# Patient Record
Sex: FEMALE | Race: WHITE | NOT HISPANIC OR LATINO | URBAN - METROPOLITAN AREA
[De-identification: names, ages, dates, MRNs, and addresses within clinical notes are randomized per-mention and may not be internally consistent; named-entity substitution may affect disease eponyms.]

---

## 2017-06-20 ENCOUNTER — OUTPATIENT (OUTPATIENT)
Dept: OUTPATIENT SERVICES | Facility: HOSPITAL | Age: 72
LOS: 1 days | Discharge: HOME | End: 2017-06-20

## 2017-09-12 DIAGNOSIS — I97.2 POSTMASTECTOMY LYMPHEDEMA SYNDROME: ICD-10-CM

## 2017-11-30 PROBLEM — Z00.00 ENCOUNTER FOR PREVENTIVE HEALTH EXAMINATION: Status: ACTIVE | Noted: 2017-11-30

## 2017-12-02 ENCOUNTER — RECORD ABSTRACTING (OUTPATIENT)
Age: 72
End: 2017-12-02

## 2017-12-07 ENCOUNTER — APPOINTMENT (OUTPATIENT)
Dept: SURGERY | Facility: CLINIC | Age: 72
End: 2017-12-07
Payer: MEDICARE

## 2017-12-07 VITALS
DIASTOLIC BLOOD PRESSURE: 72 MMHG | HEIGHT: 61 IN | SYSTOLIC BLOOD PRESSURE: 120 MMHG | BODY MASS INDEX: 31.34 KG/M2 | WEIGHT: 166 LBS

## 2017-12-07 PROCEDURE — 99212 OFFICE O/P EST SF 10 MIN: CPT

## 2018-06-07 ENCOUNTER — APPOINTMENT (OUTPATIENT)
Dept: SURGERY | Facility: CLINIC | Age: 73
End: 2018-06-07
Payer: MEDICARE

## 2018-06-07 ENCOUNTER — OTHER (OUTPATIENT)
Age: 73
End: 2018-06-07

## 2018-06-07 VITALS
HEIGHT: 61 IN | SYSTOLIC BLOOD PRESSURE: 136 MMHG | WEIGHT: 160 LBS | DIASTOLIC BLOOD PRESSURE: 82 MMHG | BODY MASS INDEX: 30.21 KG/M2

## 2018-06-07 PROCEDURE — 99213 OFFICE O/P EST LOW 20 MIN: CPT

## 2018-09-17 ENCOUNTER — APPOINTMENT (OUTPATIENT)
Dept: PLASTIC SURGERY | Facility: CLINIC | Age: 73
End: 2018-09-17
Payer: MEDICARE

## 2018-09-17 VITALS — HEIGHT: 61 IN | BODY MASS INDEX: 30.4 KG/M2 | WEIGHT: 161 LBS

## 2018-09-17 DIAGNOSIS — Z92.3 PERSONAL HISTORY OF IRRADIATION: ICD-10-CM

## 2018-09-17 DIAGNOSIS — Z86.018 PERSONAL HISTORY OF OTHER BENIGN NEOPLASM: ICD-10-CM

## 2018-09-17 DIAGNOSIS — Z78.9 OTHER SPECIFIED HEALTH STATUS: ICD-10-CM

## 2018-09-17 DIAGNOSIS — Z92.21 PERSONAL HISTORY OF ANTINEOPLASTIC CHEMOTHERAPY: ICD-10-CM

## 2018-09-17 DIAGNOSIS — Z85.3 PERSONAL HISTORY OF MALIGNANT NEOPLASM OF BREAST: ICD-10-CM

## 2018-09-17 PROCEDURE — 99203 OFFICE O/P NEW LOW 30 MIN: CPT

## 2018-09-17 RX ORDER — UBIDECARENONE 100 MG
100 CAPSULE ORAL
Refills: 0 | Status: DISCONTINUED | COMMUNITY
End: 2018-09-17

## 2019-06-03 ENCOUNTER — APPOINTMENT (OUTPATIENT)
Dept: SURGERY | Facility: CLINIC | Age: 74
End: 2019-06-03
Payer: MEDICARE

## 2019-06-03 VITALS
HEIGHT: 61 IN | WEIGHT: 164 LBS | BODY MASS INDEX: 30.96 KG/M2 | SYSTOLIC BLOOD PRESSURE: 128 MMHG | DIASTOLIC BLOOD PRESSURE: 74 MMHG

## 2019-06-03 PROCEDURE — 99212 OFFICE O/P EST SF 10 MIN: CPT

## 2019-06-04 NOTE — PHYSICAL EXAM
[de-identified] : no adenopathy [de-identified] : no new masses or suspicious areas are palpable bilaterally.  The implants are asymmetrical but appeared to be intact. No axillary adenopathy or upper extremity bilaterally.

## 2019-06-04 NOTE — HISTORY OF PRESENT ILLNESS
[de-identified] : The patient notes no new symptoms or changes in either breast region. She is still unhappy with the cosmetic outcome of her breast reconstruction and since her last office visit in July of last year, she has consulted plastic surgery and plans to return to Dr. Nathan when ready to proceed with revision surgery.\par \par July 2015: Left MRM (T2N1), right simple mastectomy and SLNB(T1cN0).

## 2019-06-07 ENCOUNTER — APPOINTMENT (OUTPATIENT)
Dept: PLASTIC SURGERY | Facility: CLINIC | Age: 74
End: 2019-06-07
Payer: MEDICARE

## 2019-06-07 VITALS — HEIGHT: 62 IN | BODY MASS INDEX: 30.18 KG/M2 | WEIGHT: 164 LBS

## 2019-06-07 PROCEDURE — 99214 OFFICE O/P EST MOD 30 MIN: CPT

## 2019-06-07 RX ORDER — LOSARTAN POTASSIUM 25 MG/1
25 TABLET, FILM COATED ORAL
Refills: 0 | Status: DISCONTINUED | COMMUNITY
End: 2019-06-07

## 2019-06-07 RX ORDER — CIPROFLOXACIN HYDROCHLORIDE 500 MG/1
500 TABLET, FILM COATED ORAL
Qty: 20 | Refills: 0 | Status: DISCONTINUED | COMMUNITY
Start: 2019-05-23 | End: 2019-06-07

## 2019-06-07 NOTE — ASSESSMENT
[FreeTextEntry1] : 74y/o F with hx of BL Breast CA s/p Chemotherapy and left breast XRT, s/p TE and silicone implant reconstruction (submuscular) with bilateral symptomatic capsular contracture.\par \par -Patient agreed to proceed with Revision of bilateral reconstructed breast with implant exchange, capsulectomy and possible acellular dermal matrix insertion. \par \par -Will address right medial dogear at time of implant exchange.\par -Reviewed implant options for saline vs. silicone implants.  Reviewed B/R/A of each.  Patient agreed for smooth, round silicone implants.  I reviewed her old operative report.  Patient has 350 smooth round high profile.\par -Regarding implant volume, patient desires slightly larger implant if possible.  Will select implant between 350-415 ml.  Patient understands final volume will rely on intraoperative assessment and upsizing may be prohibitive by XRT soft tissue changes.\par -Reviewed and confirmed my recommendation for staged autologous fat grafting at a later staged after implant exchange.  I also recommended addressing the right axillary breast tissue at a later stage with direct excision.  \par -Patient endorsed that she would like to proceed with implant exchange before considering other treatment options if needed in the future (pedicled latissimus dorsi flap reconstruction +/- silicone implant for symmetry).\par -Regarding implant exchange, the benefits, risks, and outcomes of implant surgery discussed.  The risks included but are not limited to bleeding, infection, seroma, hematoma, paraesthesia, poor wound healing, implant failure (infection, extrusion, rupture), implant malposition, capsular contracture, asymmetry, small risk of breast-implant associated lymphoma, possible need for additional planned or unplanned surgery including revision and/or autologous tissue reconstruction (e.g. pedicled latissismus dorsi flap, TDAP flap, etc), and dissatisfaction with outcome.  We also discussed the likelihood of additional surgery on the breast over the course of her lifetime.\par -Hold aspirin 81 mg 1 week before surgery, in not otherwise contraindicated.\par -I counseled her to follow up with the Endocrinologist for hypercalcemia clearance and medical clearance before proceeding with breast reconstruction\par -All questions were answered to the patient's apparent satisfaction.  Informed consent was obtained.\par -Will schedule outpatient procedure under CA at the patient earliest convenience

## 2019-06-07 NOTE — HISTORY OF PRESENT ILLNESS
[FreeTextEntry1] : 74 yo with history of bilateral breast cancer (dx 2015-left stage T2N1 invasive carcinoma, right- T1CN) invasive carcinoma, ER/[place] positive) s/p left MRM and right simple mastectomy and SLN followed by subpectoral TE placement, ?mesh, and silicone implant exchange with Dr. Brown at [facility]. Pt also underwent CTX completed Dec/16 and XRT completed in 2016. Denies wound healing issues. Pt now c/o dissatisfaction with cosmetic result as well as left breast discomfort. Sought second opinion with Dr. Brown in [place].  \par \par Pt was also recently diagnosed with hypercalcemia on lab work, pending endocrinology consultation for further workup.  Nonsmoker\par \par Interval hx (6/7/19): Here for follow up to confirm her breast reconstruction revision plan.  Pt just returned from [place] were she was for the past 6 months.  Her  is present for the encourter.  She reports that her hypercalcemia is now stable and has be taken off of calcium therapy (Dr. Lama).  She was evaluated by Dr. Renae who reports no evidence of breast disease.  Patient is here to confirm her breast revision surgical plan.  Pt is still interested in less invasive breast reconstructive option.

## 2019-06-07 NOTE — PHYSICAL EXAM
[de-identified] : well-appearing, NAD [de-identified] : BL breasts with well-healed mastectomy scars, left breast with radiation changes, implant palpable in subpectoral space; BL breasts with obvious cosmetic deformities, with contour depression/indentations at incisions, L>R, left extending to axilla; right breast with excess axillary tissue fold vs accessory breast tissue and medial inferior dogear deformity; left breast chronic radiation skin changes.  No palpable masses along bilateral mastectomy incisions.\par Right breast: grade 3 capsular contracture, implant well positioned at IMF\par Left breast: grade 4 painful capsular contracture, implant well positioned at IMF [de-identified] : good inspiratory effort [de-identified] : Palpable LD muscle with moderate soft tissue bulk, bilateral [de-identified] : protuberant abdomen with moderate lipodystrophy

## 2019-06-21 ENCOUNTER — TRANSCRIPTION ENCOUNTER (OUTPATIENT)
Age: 74
End: 2019-06-21

## 2019-06-27 ENCOUNTER — OUTPATIENT (OUTPATIENT)
Dept: OUTPATIENT SERVICES | Facility: HOSPITAL | Age: 74
LOS: 1 days | Discharge: HOME | End: 2019-06-27

## 2019-06-27 ENCOUNTER — FORM ENCOUNTER (OUTPATIENT)
Age: 74
End: 2019-06-27

## 2019-06-28 ENCOUNTER — OUTPATIENT (OUTPATIENT)
Dept: OUTPATIENT SERVICES | Facility: HOSPITAL | Age: 74
LOS: 1 days | Discharge: HOME | End: 2019-06-28
Payer: MEDICARE

## 2019-06-28 VITALS
TEMPERATURE: 98 F | HEART RATE: 76 BPM | RESPIRATION RATE: 16 BRPM | HEIGHT: 62 IN | DIASTOLIC BLOOD PRESSURE: 100 MMHG | OXYGEN SATURATION: 99 % | SYSTOLIC BLOOD PRESSURE: 162 MMHG | WEIGHT: 166.45 LBS

## 2019-06-28 DIAGNOSIS — Z90.49 ACQUIRED ABSENCE OF OTHER SPECIFIED PARTS OF DIGESTIVE TRACT: Chronic | ICD-10-CM

## 2019-06-28 DIAGNOSIS — Z90.13 ACQUIRED ABSENCE OF BILATERAL BREASTS AND NIPPLES: Chronic | ICD-10-CM

## 2019-06-28 DIAGNOSIS — Z85.3 PERSONAL HISTORY OF MALIGNANT NEOPLASM OF BREAST: ICD-10-CM

## 2019-06-28 DIAGNOSIS — Z98.49 CATARACT EXTRACTION STATUS, UNSPECIFIED EYE: Chronic | ICD-10-CM

## 2019-06-28 DIAGNOSIS — Z01.818 ENCOUNTER FOR OTHER PREPROCEDURAL EXAMINATION: ICD-10-CM

## 2019-06-28 DIAGNOSIS — N39.0 URINARY TRACT INFECTION, SITE NOT SPECIFIED: ICD-10-CM

## 2019-06-28 DIAGNOSIS — Z90.710 ACQUIRED ABSENCE OF BOTH CERVIX AND UTERUS: Chronic | ICD-10-CM

## 2019-06-28 DIAGNOSIS — Z98.82 BREAST IMPLANT STATUS: Chronic | ICD-10-CM

## 2019-06-28 LAB
ALBUMIN SERPL ELPH-MCNC: 4.7 G/DL — SIGNIFICANT CHANGE UP (ref 3.5–5.2)
ALP SERPL-CCNC: 102 U/L — SIGNIFICANT CHANGE UP (ref 30–115)
ALT FLD-CCNC: 17 U/L — SIGNIFICANT CHANGE UP (ref 0–41)
ANION GAP SERPL CALC-SCNC: 14 MMOL/L — SIGNIFICANT CHANGE UP (ref 7–14)
APTT BLD: 32.6 SEC — SIGNIFICANT CHANGE UP (ref 27–39.2)
AST SERPL-CCNC: 21 U/L — SIGNIFICANT CHANGE UP (ref 0–41)
BILIRUB SERPL-MCNC: 0.5 MG/DL — SIGNIFICANT CHANGE UP (ref 0.2–1.2)
BUN SERPL-MCNC: 14 MG/DL — SIGNIFICANT CHANGE UP (ref 10–20)
CALCIUM SERPL-MCNC: 10.7 MG/DL — HIGH (ref 8.5–10.1)
CHLORIDE SERPL-SCNC: 99 MMOL/L — SIGNIFICANT CHANGE UP (ref 98–110)
CO2 SERPL-SCNC: 30 MMOL/L — SIGNIFICANT CHANGE UP (ref 17–32)
CREAT SERPL-MCNC: 0.7 MG/DL — SIGNIFICANT CHANGE UP (ref 0.7–1.5)
GLUCOSE SERPL-MCNC: 109 MG/DL — HIGH (ref 70–99)
HCT VFR BLD CALC: 43.5 % — SIGNIFICANT CHANGE UP (ref 37–47)
HGB BLD-MCNC: 14.7 G/DL — SIGNIFICANT CHANGE UP (ref 12–16)
INR BLD: 0.93 RATIO — SIGNIFICANT CHANGE UP (ref 0.65–1.3)
MCHC RBC-ENTMCNC: 30.8 PG — SIGNIFICANT CHANGE UP (ref 27–31)
MCHC RBC-ENTMCNC: 33.8 G/DL — SIGNIFICANT CHANGE UP (ref 32–37)
MCV RBC AUTO: 91.2 FL — SIGNIFICANT CHANGE UP (ref 81–99)
NRBC # BLD: 0 /100 WBCS — SIGNIFICANT CHANGE UP (ref 0–0)
PLATELET # BLD AUTO: 245 K/UL — SIGNIFICANT CHANGE UP (ref 130–400)
POTASSIUM SERPL-MCNC: 4.1 MMOL/L — SIGNIFICANT CHANGE UP (ref 3.5–5)
POTASSIUM SERPL-SCNC: 4.1 MMOL/L — SIGNIFICANT CHANGE UP (ref 3.5–5)
PROT SERPL-MCNC: 8 G/DL — SIGNIFICANT CHANGE UP (ref 6–8)
PROTHROM AB SERPL-ACNC: 10.7 SEC — SIGNIFICANT CHANGE UP (ref 9.95–12.87)
RBC # BLD: 4.77 M/UL — SIGNIFICANT CHANGE UP (ref 4.2–5.4)
RBC # FLD: 12.7 % — SIGNIFICANT CHANGE UP (ref 11.5–14.5)
SODIUM SERPL-SCNC: 143 MMOL/L — SIGNIFICANT CHANGE UP (ref 135–146)
WBC # BLD: 10.15 K/UL — SIGNIFICANT CHANGE UP (ref 4.8–10.8)
WBC # FLD AUTO: 10.15 K/UL — SIGNIFICANT CHANGE UP (ref 4.8–10.8)

## 2019-06-28 PROCEDURE — 93010 ELECTROCARDIOGRAM REPORT: CPT

## 2019-06-28 PROCEDURE — 71046 X-RAY EXAM CHEST 2 VIEWS: CPT | Mod: 26

## 2019-06-28 NOTE — H&P PST ADULT - NSICDXPASTSURGICALHX_GEN_ALL_CORE_FT
PAST SURGICAL HISTORY:  H/O bilateral mastectomy     History of colon resection 2001    S/P bilateral breast implants with expanders 2016    S/P cataract surgery     S/P hysterectomy 1994

## 2019-06-28 NOTE — H&P PST ADULT - NSANTHOSAYNRD_GEN_A_CORE
No. STEPHANIE screening performed.  STOP BANG Legend: 0-2 = LOW Risk; 3-4 = INTERMEDIATE Risk; 5-8 = HIGH Risk/see stephanie tool

## 2019-06-28 NOTE — H&P PST ADULT - HISTORY OF PRESENT ILLNESS
74 year old female here for breast bilateral reconstruction  fos=2  denies chest pain sob palp  denies recent uri s/p uti 12 days ago and was treated

## 2019-06-28 NOTE — H&P PST ADULT - NSICDXPASTMEDICALHX_GEN_ALL_CORE_FT
PAST MEDICAL HISTORY:  Breast cancer left and right /chemotherapy finished 2016 dec/radiation    High cholesterol     History of small bowel obstruction     HTN (hypertension)

## 2019-06-28 NOTE — H&P PST ADULT - REASON FOR ADMISSION
bilateral reconstruction breast revision with implant exchange and capsulectomy possible acellular dermal matrix

## 2019-07-01 ENCOUNTER — RECORD ABSTRACTING (OUTPATIENT)
Age: 74
End: 2019-07-01

## 2019-07-01 DIAGNOSIS — Z80.3 FAMILY HISTORY OF MALIGNANT NEOPLASM OF BREAST: ICD-10-CM

## 2019-07-01 DIAGNOSIS — Z86.19 PERSONAL HISTORY OF OTHER INFECTIOUS AND PARASITIC DISEASES: ICD-10-CM

## 2019-07-01 PROBLEM — I10 ESSENTIAL (PRIMARY) HYPERTENSION: Chronic | Status: ACTIVE | Noted: 2019-06-28

## 2019-07-01 PROBLEM — E78.00 PURE HYPERCHOLESTEROLEMIA, UNSPECIFIED: Chronic | Status: ACTIVE | Noted: 2019-06-28

## 2019-07-01 PROBLEM — C50.919 MALIGNANT NEOPLASM OF UNSPECIFIED SITE OF UNSPECIFIED FEMALE BREAST: Chronic | Status: ACTIVE | Noted: 2019-06-28

## 2019-07-01 PROBLEM — Z87.19 PERSONAL HISTORY OF OTHER DISEASES OF THE DIGESTIVE SYSTEM: Chronic | Status: ACTIVE | Noted: 2019-06-28

## 2019-07-01 LAB
CYTOLOGY CVX/VAG DOC THIN PREP: NORMAL
CYTOLOGY CVX/VAG DOC THIN PREP: NORMAL

## 2019-07-02 ENCOUNTER — APPOINTMENT (OUTPATIENT)
Dept: OBGYN | Facility: CLINIC | Age: 74
End: 2019-07-02
Payer: MEDICARE

## 2019-07-02 VITALS
WEIGHT: 165 LBS | BODY MASS INDEX: 30.36 KG/M2 | SYSTOLIC BLOOD PRESSURE: 150 MMHG | HEIGHT: 62 IN | DIASTOLIC BLOOD PRESSURE: 100 MMHG

## 2019-07-02 DIAGNOSIS — N39.0 URINARY TRACT INFECTION, SITE NOT SPECIFIED: ICD-10-CM

## 2019-07-02 DIAGNOSIS — N88.2 STRICTURE AND STENOSIS OF CERVIX UTERI: ICD-10-CM

## 2019-07-02 PROCEDURE — 99214 OFFICE O/P EST MOD 30 MIN: CPT

## 2019-07-02 NOTE — PHYSICAL EXAM
[Normal] : urethra [Atrophy] : atrophy [No Bleeding] : there was no active vaginal bleeding [Absent] : absent [Cystocele] : a cystocele [FreeTextEntry5] : Cervix noted to be mildly stenotic [Uterine Adnexae] : were not tender and not enlarged

## 2019-07-08 NOTE — ASU PATIENT PROFILE, ADULT - PMH
Breast cancer  left and right /chemotherapy finished 2016 dec/radiation  High cholesterol    History of small bowel obstruction    HTN (hypertension)

## 2019-07-08 NOTE — ASU PATIENT PROFILE, ADULT - PSH
H/O bilateral mastectomy    History of colon resection  2001  S/P bilateral breast implants  with expanders 2016  S/P cataract surgery    S/P hysterectomy  1994

## 2019-07-08 NOTE — ASU PATIENT PROFILE, ADULT - AS SC BRADEN MOISTURE
(4) rarely moist no strenuous activity, no bending lifting or twisting/Showering allowed/Walking-Indoors allowed/Walking-Outdoors allowed/No Heavy lifting/straining/Do not drive or operate machinery/Stairs allowed

## 2019-07-09 ENCOUNTER — RESULT REVIEW (OUTPATIENT)
Age: 74
End: 2019-07-09

## 2019-07-09 ENCOUNTER — OUTPATIENT (OUTPATIENT)
Dept: OUTPATIENT SERVICES | Facility: HOSPITAL | Age: 74
LOS: 1 days | Discharge: HOME | End: 2019-07-09
Payer: MEDICARE

## 2019-07-09 ENCOUNTER — APPOINTMENT (OUTPATIENT)
Dept: PLASTIC SURGERY | Facility: AMBULATORY SURGERY CENTER | Age: 74
End: 2019-07-09
Payer: MEDICARE

## 2019-07-09 VITALS
DIASTOLIC BLOOD PRESSURE: 56 MMHG | RESPIRATION RATE: 25 BRPM | SYSTOLIC BLOOD PRESSURE: 115 MMHG | OXYGEN SATURATION: 97 % | HEART RATE: 60 BPM

## 2019-07-09 VITALS
HEIGHT: 62 IN | TEMPERATURE: 98 F | RESPIRATION RATE: 18 BRPM | WEIGHT: 166.45 LBS | HEART RATE: 82 BPM | OXYGEN SATURATION: 97 % | DIASTOLIC BLOOD PRESSURE: 89 MMHG | SYSTOLIC BLOOD PRESSURE: 190 MMHG

## 2019-07-09 DIAGNOSIS — Z98.82 BREAST IMPLANT STATUS: Chronic | ICD-10-CM

## 2019-07-09 DIAGNOSIS — Z98.49 CATARACT EXTRACTION STATUS, UNSPECIFIED EYE: Chronic | ICD-10-CM

## 2019-07-09 DIAGNOSIS — Z90.49 ACQUIRED ABSENCE OF OTHER SPECIFIED PARTS OF DIGESTIVE TRACT: Chronic | ICD-10-CM

## 2019-07-09 DIAGNOSIS — Z90.710 ACQUIRED ABSENCE OF BOTH CERVIX AND UTERUS: Chronic | ICD-10-CM

## 2019-07-09 DIAGNOSIS — Z90.13 ACQUIRED ABSENCE OF BILATERAL BREASTS AND NIPPLES: Chronic | ICD-10-CM

## 2019-07-09 PROCEDURE — 88304 TISSUE EXAM BY PATHOLOGIST: CPT | Mod: 26

## 2019-07-09 PROCEDURE — 19380 REVJ RECONSTRUCTED BREAST: CPT | Mod: 50,59

## 2019-07-09 PROCEDURE — 19371 PERI-IMPLT CAPSLC BRST COMPL: CPT | Mod: 50

## 2019-07-09 PROCEDURE — 15777 ACELLULAR DERM MATRIX IMPLT: CPT | Mod: 50,59

## 2019-07-09 PROCEDURE — 88305 TISSUE EXAM BY PATHOLOGIST: CPT | Mod: 26

## 2019-07-09 RX ORDER — TRAMADOL HYDROCHLORIDE 50 MG/1
1 TABLET ORAL
Qty: 10 | Refills: 0
Start: 2019-07-09 | End: 2019-07-11

## 2019-07-09 RX ORDER — SODIUM CHLORIDE 9 MG/ML
1000 INJECTION, SOLUTION INTRAVENOUS
Refills: 0 | Status: DISCONTINUED | OUTPATIENT
Start: 2019-07-09 | End: 2019-07-24

## 2019-07-09 RX ORDER — ONDANSETRON 8 MG/1
4 TABLET, FILM COATED ORAL ONCE
Refills: 0 | Status: DISCONTINUED | OUTPATIENT
Start: 2019-07-09 | End: 2019-07-24

## 2019-07-09 RX ORDER — OXYCODONE AND ACETAMINOPHEN 5; 325 MG/1; MG/1
2 TABLET ORAL EVERY 6 HOURS
Refills: 0 | Status: DISCONTINUED | OUTPATIENT
Start: 2019-07-09 | End: 2019-07-09

## 2019-07-09 RX ORDER — MORPHINE SULFATE 50 MG/1
2 CAPSULE, EXTENDED RELEASE ORAL
Refills: 0 | Status: DISCONTINUED | OUTPATIENT
Start: 2019-07-09 | End: 2019-07-24

## 2019-07-09 RX ORDER — MORPHINE SULFATE 50 MG/1
4 CAPSULE, EXTENDED RELEASE ORAL
Refills: 0 | Status: DISCONTINUED | OUTPATIENT
Start: 2019-07-09 | End: 2019-07-09

## 2019-07-09 RX ORDER — OXYCODONE AND ACETAMINOPHEN 5; 325 MG/1; MG/1
1 TABLET ORAL EVERY 4 HOURS
Refills: 0 | Status: DISCONTINUED | OUTPATIENT
Start: 2019-07-09 | End: 2019-07-09

## 2019-07-09 RX ADMIN — SODIUM CHLORIDE 100 MILLILITER(S): 9 INJECTION, SOLUTION INTRAVENOUS at 11:05

## 2019-07-09 NOTE — ASU DISCHARGE PLAN (ADULT/PEDIATRIC) - PAIN MANAGEMENT
Doxycycline, Tramadol as needed for pain/Prescriptions electronically submitted to pharmacy from [facility]

## 2019-07-09 NOTE — PRE-ANESTHESIA EVALUATION ADULT - NSPROPOSEDPROCEDFT_GEN_ALL_CORE
Bilateral reconstructed breast revision with implant exchange and capsulectomy, possible acellular dermal matrix

## 2019-07-09 NOTE — ASU DISCHARGE PLAN (ADULT/PEDIATRIC) - ASU DC SPECIAL INSTRUCTIONSFT
Keep surgical site clean and dry.   Do not remove any dressings or get them wet.   Wear surgical bra at all times.   Rest, no heavy lifting.

## 2019-07-09 NOTE — ASU DISCHARGE PLAN (ADULT/PEDIATRIC) - PROCEDURE
Revision of bilateral breast reconstruction, replacement of implants, capsulectomy and placement of dermat matrix.

## 2019-07-09 NOTE — BRIEF OPERATIVE NOTE - OPERATION/FINDINGS
Bilateral reconstructed breast revision with implant exchange and capsulectomy, acellular dermal matrix. Bilateral reconstructed breast revision with implant exchange and capsulectomy, acellular dermal matrix inseration; right medial breast dogear excision.

## 2019-07-09 NOTE — ASU DISCHARGE PLAN (ADULT/PEDIATRIC) - CARE PROVIDER_API CALL
Najma Nathan)  [facility]  [address] [place] [address], [address]  [place] [address]  Phone: [phone]  Fax: [phone]  Follow Up Time:

## 2019-07-09 NOTE — PRE-ANESTHESIA EVALUATION ADULT - NSANTHOSAYNRD_GEN_A_CORE
see kev tool/No. KEV screening performed.  STOP BANG Legend: 0-2 = LOW Risk; 3-4 = INTERMEDIATE Risk; 5-8 = HIGH Risk

## 2019-07-09 NOTE — ASU DISCHARGE PLAN (ADULT/PEDIATRIC) - CALL YOUR DOCTOR IF YOU HAVE ANY OF THE FOLLOWING:
Pain not relieved by Medications/Bleeding that does not stop/Swelling that gets worse/Wound/Surgical Site with redness, or foul smelling discharge or pus

## 2019-07-09 NOTE — BRIEF OPERATIVE NOTE - NSICDXBRIEFPROCEDURE_GEN_ALL_CORE_FT
PROCEDURES:  Application, acellular dermal matrix 09-Jul-2019 10:48:30  Bonnie Tafoya  Revision of breast implant 09-Jul-2019 10:47:45  Bonnie Tafoya  Capsulectomy, breast 09-Jul-2019 10:46:28 bilateral Bonnie Tafoya  Revision, reconstruction, breast 09-Jul-2019 10:45:27  Bonnie Tafoya
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
## 2019-07-11 DIAGNOSIS — I10 ESSENTIAL (PRIMARY) HYPERTENSION: ICD-10-CM

## 2019-07-11 DIAGNOSIS — Z88.2 ALLERGY STATUS TO SULFONAMIDES: ICD-10-CM

## 2019-07-11 DIAGNOSIS — Z88.8 ALLERGY STATUS TO OTHER DRUGS, MEDICAMENTS AND BIOLOGICAL SUBSTANCES: ICD-10-CM

## 2019-07-11 DIAGNOSIS — Z85.3 PERSONAL HISTORY OF MALIGNANT NEOPLASM OF BREAST: ICD-10-CM

## 2019-07-11 DIAGNOSIS — E78.00 PURE HYPERCHOLESTEROLEMIA, UNSPECIFIED: ICD-10-CM

## 2019-07-11 DIAGNOSIS — T85.44XA CAPSULAR CONTRACTURE OF BREAST IMPLANT, INITIAL ENCOUNTER: ICD-10-CM

## 2019-07-12 ENCOUNTER — APPOINTMENT (OUTPATIENT)
Dept: PLASTIC SURGERY | Facility: CLINIC | Age: 74
End: 2019-07-12
Payer: MEDICARE

## 2019-07-12 LAB — SURGICAL PATHOLOGY STUDY: SIGNIFICANT CHANGE UP

## 2019-07-12 PROCEDURE — 99024 POSTOP FOLLOW-UP VISIT: CPT

## 2019-07-12 NOTE — HISTORY OF PRESENT ILLNESS
[FreeTextEntry1] : 72 yo with history of bilateral breast cancer (dx 2015-left stage T2N1 invasive carcinoma, right- T1CN) invasive carcinoma, ER/[place] positive) s/p left MRM and right simple mastectomy and SLN followed by subpectoral TE placement, ?mesh, and silicone implant exchange with Dr. Brown at [facility]. Pt also underwent CTX completed Dec/16 and XRT completed in 2016. Denies wound healing issues. Pt now c/o dissatisfaction with cosmetic result as well as left breast discomfort. Sought second opinion with Dr. Brown in [place]. \par \par Pt was also recently diagnosed with hypercalcemia on lab work, pending endocrinology consultation for further workup. Nonsmoker\par \par Interval hx (6/7/19): Here for follow up to confirm her breast reconstruction revision plan. Pt just returned from [place] were she was for the past 6 months. Her  is present for the encourter. She reports that her hypercalcemia is now stable and has be taken off of calcium therapy (Dr. Lama). She was evaluated by Dr. Renae who reports no evidence of breast disease. Patient is here to confirm her breast revision surgical plan. Pt is still interested in less invasive breast reconstructive option. \par  \par Interval hx (7/12/19): Pt presents for bandage change- POD #3 s/p bilateral breast implant exchange, alloderm placement, and capsulectomy. C/o right breast drainage saturating bandage. Denies pain, f/c, or purulence.

## 2019-07-12 NOTE — ASSESSMENT
[FreeTextEntry1] : 74y/o F with hx of BL Breast CA s/p Chemotherapy and left breast XRT, s/p TE and silicone implant reconstruction (submuscular) with bilateral symptomatic capsular contracture.\par Now POD #3 s/p bilateral breast implant exchange, alloderm placement, and capsulectomy\par -Bandages changed\par -Continue PO abx to completion\par -tegaderm d/c'd due to possible irritation\par -Activity restrictions discussed\par -Knows to call for fevers, worsening redness, or persistent drainage\par -F/u 3 days as scheduled

## 2019-07-12 NOTE — PHYSICAL EXAM
[de-identified] : bilateral breasts implants soft and in good position, superficial hyperemia to incision sites L>R, nontender, no palpable fluid collection, right breast dressing with copious serosanguineous drainage, no active drainage, no open wounds, suture lines C/D/I [de-identified] : well-appearing, NAD

## 2019-07-15 ENCOUNTER — APPOINTMENT (OUTPATIENT)
Dept: PLASTIC SURGERY | Facility: CLINIC | Age: 74
End: 2019-07-15
Payer: MEDICARE

## 2019-07-15 PROCEDURE — 99024 POSTOP FOLLOW-UP VISIT: CPT

## 2019-07-15 NOTE — ASSESSMENT
[FreeTextEntry1] : 72 y/o F with hx of BL Breast CA s/p Chemotherapy and left breast XRT, s/p TE and silicone implant reconstruction (submuscular) with bilateral symptomatic capsular contracture.\par Now POD #6 s/p bilateral breast implant exchange, AlloDerm placement, and capsulectomy. Doing well and very happy with results. \par \par -Dressings changed\par -Continue PO abx to completion\par -Bandeau applied\par -Activity restrictions discussed\par -May shower\par -F/u 1 week for suture removal with Dr. Nathan\par

## 2019-07-15 NOTE — DATA REVIEWED
[FreeTextEntry1] : \par  Pathology             Final\par \par No Documents Attached\par \par \par \par \par   [facility] Accession Number : [id number]\par \par WOODY RINCON                     2\par \par \par \par Surgical Final Report\par \par \par \par \par Final Diagnosis\par 1. Breast, right skin scar, revision/excision:\par - Benign periareolar skin with dermal scar.\par \par 2. Breast, left skin scar, revision/excision:\par - Benign periareolar skin with dermal scar. Negative for\par carcinoma.\par \par 3. Breast, right tissue, capsulectomy:\par - Benign fibroadipose connective tissue with focal dense linear\par fibrosis associated with mild chronic inflammation and focal\par pseudosynovial metaplasia; consistent with a synthetic breast\par implant soft tissue capsule.\par \par 4. Breast, left tissue, capsulectomy:\par - Benign fibroadipose connective tissue and skeletal muscle with\par focal dense linear fibrosis associated with mild chronic\par lymphohistiocytic cell inflammation and focal pseudosynovial\par metaplasia; consistent with a synthetic\par breast implant soft tissue capsule.\par \par Verified by: Zen Gould M.D.\par (Electronic Signature)\par Reported on: 07/12/19 12:55 EDT, [facility] [address]\par _________________________________________________________________\par \par Clinical History\par Bilateral revision of reconstructed breasts, implant exchange,\par subtotal capsulectomy, alloderm insertion\par \par Specimen(s) Submitted\par 1     Right breast scar\par 2     Left breast scar\par 3     Right breast capsule\par 4     Left breast  capsule\par \par Gross Description\par 1. The specimen is received fresh, labeled "right breast scar"\par and consists of two fragments, one is a skin ellipse with\par subcutaneous tissue measuring 6.5 x 0.2 x 0.2 cm. The other\par fragment is a piece of tan gray skin measuring 3.5 x 0.9 x 1 cm.\par A linear scar is seen in skin ellipse. The specimen is serially\par sectioned. Representative sections are submitted. (2 blocks)\par \par 07/10/19 10:08 mk\par \par \par \par \par \par \par \par WOODY RINCON                     2\par \par \par \par Surgical Final Report\par \par \par \par \par 2. The specimen is received fresh, labeled "left breast scar" and\par consists of a thin rim of tan gray skin measuring 6 x 0.2 x 0.2\par cm. Both edges are inked blue. Specimen is serially cross\par sectioned. Representative sections are submitted. (1 block)\par \par 3. The specimen is received fresh, labeled "right breast capsule"\par and consists of multiple fragments of thin layer of fibrotic\par tissue measuring 4 x 3 x 1 cm in aggregate. The outer surface is\par inked blue. Representative sections are submitted. (1 block)\par \par 4. The specimen is received fresh labeled "left breast capsule"\par and consists of tan yellow to white fibromembranous tissue\par measuring 9.5 x 6.5 x 0.8 cm. Outer surface is yellow and rough\par and inked black. The inner surface is smooth and unremarkable.\par Representative sections are submitted. (2 blocks)\par \par Specimen was received and underwent gross examination at [facility], [address] [place] [address]\[place] [address].\par \par 07/10/19 10:06 ws\par \par Perioperative Diagnosis\par Breast implant capsular contracture\par \par Postoperative Diagnosis\par Bilateral implant capsular contracture, history of left breast ca\par \par  \par \par  Ordered by: JEFFERSON HARPER       Collected/Examined: [id number] 08:26AM       \par Verification Required       Stage: Final       \par  Performed at: [facility]       Resulted: [id number] 12:55PM       Last Updated: [id number] 12:55PM       Accession: [id number]

## 2019-07-15 NOTE — HISTORY OF PRESENT ILLNESS
[FreeTextEntry1] : 74 yo with history of bilateral breast cancer (dx 2015-left stage T2N1 invasive carcinoma, right- T1CN) invasive carcinoma, ER/[place] positive) s/p left MRM and right simple mastectomy and SLN followed by subpectoral TE placement, ?mesh, and silicone implant exchange with Dr. Brown at [facility]. Pt also underwent CTX completed Dec/16 and XRT completed in 2016. Denies wound healing issues. Pt now c/o dissatisfaction with cosmetic result as well as left breast discomfort. Sought second opinion with Dr. Brown in [place]. \par \par Pt was also recently diagnosed with hypercalcemia on lab work, pending endocrinology consultation for further workup. Nonsmoker\par \par Interval hx (6/7/19): Here for follow up to confirm her breast reconstruction revision plan. Pt just returned from [place] were she was for the past 6 months. Her  is present for the encounter. She reports that her hypercalcemia is now stable and has be taken off of calcium therapy (Dr. Lama). She was evaluated by Dr. Renae who reports no evidence of breast disease. Patient is here to confirm her breast revision surgical plan. Pt is still interested in less invasive breast reconstructive option. \par \par Interval hx (7/12/19): Pt presents for bandage change- POD #3 s/p bilateral breast implant exchange, AlloDerm placement, and capsulectomy. C/o right breast drainage saturating bandage. Denies pain, f/c, or purulence. \par \par Interval hx (7/15/19). Patient presents today POD#6 s/p bilateral breast implant exchange, AlloDerm placement, and capsulectomy. Doing well and very pleased with her result reporting improvement in pain, discomfort and overall aesthetics of her breasts. Compliant with oral antibiotics and surgical bra. Denies any fever, chills, drainage or bleeding. \par \par  \par

## 2019-07-15 NOTE — PHYSICAL EXAM
[de-identified] : well-developed female, NAD [de-identified] : bilateral breast implants soft and in good position, incisions clean, dry and intact with hyperemic changes of left kat incisional area, stable, no evidence of cellulitis or palpable fluid collection

## 2019-07-22 ENCOUNTER — FORM ENCOUNTER (OUTPATIENT)
Age: 74
End: 2019-07-22

## 2019-07-22 ENCOUNTER — APPOINTMENT (OUTPATIENT)
Dept: PLASTIC SURGERY | Facility: CLINIC | Age: 74
End: 2019-07-22
Payer: MEDICARE

## 2019-07-22 PROCEDURE — 99024 POSTOP FOLLOW-UP VISIT: CPT

## 2019-07-22 NOTE — HISTORY OF PRESENT ILLNESS
[FreeTextEntry1] : 72 yo with history of bilateral breast cancer (dx 2015-left stage T2N1 invasive carcinoma, right- T1CN) invasive carcinoma, ER/[place] positive) s/p left MRM and right simple mastectomy and SLN followed by subpectoral TE placement, ?mesh, and silicone implant exchange with Dr. Brown at [facility]. Pt also underwent CTX completed Dec/16 and XRT completed in 2016. Denies wound healing issues. Pt now c/o dissatisfaction with cosmetic result as well as left breast discomfort. Sought second opinion with Dr. Brown in [place]. \par \par Pt was also recently diagnosed with hypercalcemia on lab work, pending endocrinology consultation for further workup. Nonsmoker\par \par Interval hx (6/7/19): Here for follow up to confirm her breast reconstruction revision plan. Pt just returned from [place] were she was for the past 6 months. Her  is present for the encounter. She reports that her hypercalcemia is now stable and has be taken off of calcium therapy (Dr. Lama). She was evaluated by Dr. Renae who reports no evidence of breast disease. Patient is here to confirm her breast revision surgical plan. Pt is still interested in less invasive breast reconstructive option. \par \par Interval hx (7/12/19): Pt presents for bandage change- POD #3 s/p bilateral breast implant exchange, AlloDerm placement, and capsulectomy. C/o right breast drainage saturating bandage. Denies pain, f/c, or purulence. \par \par Interval hx (7/15/19). Patient presents today POD#6 s/p bilateral breast implant exchange, AlloDerm placement, and capsulectomy. Doing well and very pleased with her result reporting improvement in pain, discomfort and overall aesthetics of her breasts. Compliant with oral antibiotics and surgical bra. Denies any fever, chills, drainage or bleeding. \par \par interval hx (7/22/19): Here for POD#13 s/p bilateral breast implant exchange.  Doing well.  Very happy with results.  Denies fevers, chills, redness.  completed course of PO abx.  compliant with bandeau and surgical bra use.\par \par  \par

## 2019-07-22 NOTE — PHYSICAL EXAM
[de-identified] : bilateral breast implants soft and in good position, incisions healing, no evidence of cellulitis\par right breast superior fullness, +fluid wave [de-identified] : well-developed female, NAD

## 2019-07-22 NOTE — ASSESSMENT
[FreeTextEntry1] : 72 y/o F with hx of BL Breast CA s/p Chemotherapy and left breast XRT, s/p TE and silicone implant reconstruction (submuscular) with bilateral symptomatic capsular contracture.\par Now POD #13 s/p bilateral breast implant exchange, AlloDerm placement, and capsulectomy. Doing well and very happy with results. \par s/p needle aspiration of small seroma anterior right breast (12 cc sterile SS)\par \par -recommend right breast US; if seroma present, guided drainage\par -c/w Bandeau\par -Activity restrictions discussed\par -F/u 1 week for seroma follow up after US study\par

## 2019-07-23 ENCOUNTER — OUTPATIENT (OUTPATIENT)
Dept: OUTPATIENT SERVICES | Facility: HOSPITAL | Age: 74
LOS: 1 days | Discharge: HOME | End: 2019-07-23
Payer: MEDICARE

## 2019-07-23 DIAGNOSIS — Z98.49 CATARACT EXTRACTION STATUS, UNSPECIFIED EYE: Chronic | ICD-10-CM

## 2019-07-23 DIAGNOSIS — Z90.13 ACQUIRED ABSENCE OF BILATERAL BREASTS AND NIPPLES: Chronic | ICD-10-CM

## 2019-07-23 DIAGNOSIS — Z90.49 ACQUIRED ABSENCE OF OTHER SPECIFIED PARTS OF DIGESTIVE TRACT: Chronic | ICD-10-CM

## 2019-07-23 DIAGNOSIS — N64.59 OTHER SIGNS AND SYMPTOMS IN BREAST: ICD-10-CM

## 2019-07-23 DIAGNOSIS — Z98.890 OTHER SPECIFIED POSTPROCEDURAL STATES: ICD-10-CM

## 2019-07-23 DIAGNOSIS — Z98.82 BREAST IMPLANT STATUS: Chronic | ICD-10-CM

## 2019-07-23 DIAGNOSIS — Z90.710 ACQUIRED ABSENCE OF BOTH CERVIX AND UTERUS: Chronic | ICD-10-CM

## 2019-07-23 PROCEDURE — 76642 ULTRASOUND BREAST LIMITED: CPT | Mod: 26,RT

## 2019-08-01 ENCOUNTER — APPOINTMENT (OUTPATIENT)
Dept: OBGYN | Facility: CLINIC | Age: 74
End: 2019-08-01
Payer: MEDICARE

## 2019-08-01 VITALS — SYSTOLIC BLOOD PRESSURE: 132 MMHG | DIASTOLIC BLOOD PRESSURE: 90 MMHG | BODY MASS INDEX: 30.18 KG/M2 | WEIGHT: 165 LBS

## 2019-08-01 DIAGNOSIS — N95.2 POSTMENOPAUSAL ATROPHIC VAGINITIS: ICD-10-CM

## 2019-08-01 DIAGNOSIS — N76.2 ACUTE VULVITIS: ICD-10-CM

## 2019-08-01 PROCEDURE — 99214 OFFICE O/P EST MOD 30 MIN: CPT

## 2019-08-01 NOTE — PHYSICAL EXAM
[Vulvar Atrophy] : vulvar atrophy [Vulvitis] : vulvitis [Normal] : cervix [Atrophy] : atrophy [Dry Mucosa] : dry mucosa [No Bleeding] : there was no active vaginal bleeding [Absent] : absent [Adnexa Absent] : absent bilaterally

## 2019-08-02 ENCOUNTER — APPOINTMENT (OUTPATIENT)
Dept: PLASTIC SURGERY | Facility: CLINIC | Age: 74
End: 2019-08-02
Payer: MEDICARE

## 2019-08-02 ENCOUNTER — RX RENEWAL (OUTPATIENT)
Age: 74
End: 2019-08-02

## 2019-08-02 DIAGNOSIS — N65.1 DISPROPORTION OF RECONSTRUCTED BREAST: ICD-10-CM

## 2019-08-02 DIAGNOSIS — N76.0 ACUTE VAGINITIS: ICD-10-CM

## 2019-08-02 DIAGNOSIS — B96.89 ACUTE VAGINITIS: ICD-10-CM

## 2019-08-02 PROCEDURE — 99024 POSTOP FOLLOW-UP VISIT: CPT

## 2019-08-03 PROBLEM — N65.1 BREAST ASYMMETRY FOLLOWING RECONSTRUCTIVE SURGERY: Status: ACTIVE | Noted: 2019-08-03

## 2019-08-03 NOTE — PHYSICAL EXAM
[de-identified] : well-developed female, NAD [de-identified] : bilateral breast implants soft and in good position, incisions healing, no evidence of cellulitis\par right breast superior fullness with implant capsule firmness, no ecchymosis; ? loculated fluid collection behind implant\par left breast with early capsular contracture and banding\par right breast  volume > left breast

## 2019-08-03 NOTE — ASSESSMENT
[FreeTextEntry1] : 74 y/o F with hx of BL Breast CA s/p Chemotherapy and left breast XRT, s/p TE and silicone implant reconstruction (submuscular) with bilateral symptomatic capsular contracture.\par Now POD #24 s/p bilateral breast implant exchange, AlloDerm placement, and capsulectomy. Unhappy with breast asymmetry.\par \par -reviewed negative right breast US results\par -no palpable seroma.  discussed possible retro-implant fluid collection contributing to breast asymmetry (volume and shape)\par -Recommend right reconstructed breast revision with implant exchange.  benefits, risks and outcomes discussed in detail.\par -Benefits, risks, and alternatives discussed.  Pt elected to proceed with recommend procedure\par -All questions were answered\par -Will schedule outpatient ANKUSH at earliest convenience\par \par -Regarding left breast, reviewed high risk of capsular contracture recurrence in setting of radiation \par -Reassurance given regarding left breast history of radiation and likelihood of recurrent capsular contracture.\par -Regarding capsular contracture, recommend singulair x 1 month trial.  Reviewed B/R/A.  Risks include liver hepatitis (elevated ALT/AST).  Pre-tx CMP ordered\par -Activity restrictions discussed\par -Sports bra\par \par \par

## 2019-08-03 NOTE — HISTORY OF PRESENT ILLNESS
[FreeTextEntry1] : 72 yo with history of bilateral breast cancer (dx 2015-left stage T2N1 invasive carcinoma, right- T1CN) invasive carcinoma, ER/LA positive) s/p left MRM and right simple mastectomy and SLN followed by subpectoral TE placement, ?mesh, and silicone implant exchange with Dr. Brown at [facility]. Pt also underwent CTX completed Dec/16 and XRT completed in 2016. Denies wound healing issues. Pt now c/o dissatisfaction with cosmetic result as well as left breast discomfort. Sought second opinion with Dr. Brown in [place]. \par \par Pt was also recently diagnosed with hypercalcemia on lab work, pending endocrinology consultation for further workup. Nonsmoker\par \par Interval hx (6/7/19): Here for follow up to confirm her breast reconstruction revision plan. Pt just returned from [place] were she was for the past 6 months. Her  is present for the encounter. She reports that her hypercalcemia is now stable and has be taken off of calcium therapy (Dr. Lama). She was evaluated by Dr. Renae who reports no evidence of breast disease. Patient is here to confirm her breast revision surgical plan. Pt is still interested in less invasive breast reconstructive option. \par \par Interval hx (7/12/19): Pt presents for bandage change- POD #3 s/p bilateral breast implant exchange, AlloDerm placement, and capsulectomy. C/o right breast drainage saturating bandage. Denies pain, f/c, or purulence. \par \par Interval hx (7/15/19). Patient presents today POD#6 s/p bilateral breast implant exchange, AlloDerm placement, and capsulectomy. Doing well and very pleased with her result reporting improvement in pain, discomfort and overall aesthetics of her breasts. Compliant with oral antibiotics and surgical bra. Denies any fever, chills, drainage or bleeding. \par \par interval hx (7/22/19): Here for POD#13 s/p bilateral breast implant exchange.  Doing well.  Very happy with results.  Denies fevers, chills, redness.  completed course of PO abx.  compliant with bandeau and surgical bra use.\par \par Interval hx (8/2/19):  Here for POD#24 s/p bilateral breast implant exchange.  Went for right breast sono with no evidence of seroma.  Denies fevers, chills, redness.  c/o of right breast superior pole breast asymmetry and difference in volume.  Also c/o left breast soft tissue banding returning.\par \par  \par

## 2019-08-13 LAB
CANDIDA VAG CYTO: NORMAL
G VAGINALIS+PREV SP MTYP VAG QL MICRO: ABNORMAL
T VAGINALIS VAG QL WET PREP: NORMAL

## 2019-08-14 ENCOUNTER — OUTPATIENT (OUTPATIENT)
Dept: OUTPATIENT SERVICES | Facility: HOSPITAL | Age: 74
LOS: 1 days | Discharge: HOME | End: 2019-08-14
Payer: MEDICARE

## 2019-08-14 VITALS
RESPIRATION RATE: 20 BRPM | SYSTOLIC BLOOD PRESSURE: 160 MMHG | WEIGHT: 164.91 LBS | TEMPERATURE: 97 F | HEIGHT: 62 IN | DIASTOLIC BLOOD PRESSURE: 80 MMHG | OXYGEN SATURATION: 100 % | HEART RATE: 90 BPM

## 2019-08-14 DIAGNOSIS — Z90.13 ACQUIRED ABSENCE OF BILATERAL BREASTS AND NIPPLES: Chronic | ICD-10-CM

## 2019-08-14 DIAGNOSIS — Z98.49 CATARACT EXTRACTION STATUS, UNSPECIFIED EYE: Chronic | ICD-10-CM

## 2019-08-14 DIAGNOSIS — Z85.3 PERSONAL HISTORY OF MALIGNANT NEOPLASM OF BREAST: ICD-10-CM

## 2019-08-14 DIAGNOSIS — Z90.49 ACQUIRED ABSENCE OF OTHER SPECIFIED PARTS OF DIGESTIVE TRACT: Chronic | ICD-10-CM

## 2019-08-14 DIAGNOSIS — Z90.710 ACQUIRED ABSENCE OF BOTH CERVIX AND UTERUS: Chronic | ICD-10-CM

## 2019-08-14 DIAGNOSIS — Z01.818 ENCOUNTER FOR OTHER PREPROCEDURAL EXAMINATION: ICD-10-CM

## 2019-08-14 DIAGNOSIS — Z98.82 BREAST IMPLANT STATUS: Chronic | ICD-10-CM

## 2019-08-14 LAB
ALBUMIN SERPL ELPH-MCNC: 4.7 G/DL — SIGNIFICANT CHANGE UP (ref 3.5–5.2)
ALP SERPL-CCNC: 145 U/L — HIGH (ref 30–115)
ALT FLD-CCNC: 15 U/L — SIGNIFICANT CHANGE UP (ref 0–41)
ANION GAP SERPL CALC-SCNC: 13 MMOL/L — SIGNIFICANT CHANGE UP (ref 7–14)
APPEARANCE UR: CLEAR — SIGNIFICANT CHANGE UP
APTT BLD: 29.9 SEC — SIGNIFICANT CHANGE UP (ref 27–39.2)
AST SERPL-CCNC: 17 U/L — SIGNIFICANT CHANGE UP (ref 0–41)
BACTERIA # UR AUTO: ABNORMAL
BASOPHILS # BLD AUTO: 0.08 K/UL — SIGNIFICANT CHANGE UP (ref 0–0.2)
BASOPHILS NFR BLD AUTO: 0.7 % — SIGNIFICANT CHANGE UP (ref 0–1)
BILIRUB SERPL-MCNC: 0.3 MG/DL — SIGNIFICANT CHANGE UP (ref 0.2–1.2)
BILIRUB UR-MCNC: NEGATIVE — SIGNIFICANT CHANGE UP
BUN SERPL-MCNC: 11 MG/DL — SIGNIFICANT CHANGE UP (ref 10–20)
CALCIUM SERPL-MCNC: 9.4 MG/DL — SIGNIFICANT CHANGE UP (ref 8.5–10.1)
CHLORIDE SERPL-SCNC: 101 MMOL/L — SIGNIFICANT CHANGE UP (ref 98–110)
CO2 SERPL-SCNC: 29 MMOL/L — SIGNIFICANT CHANGE UP (ref 17–32)
COLOR SPEC: COLORLESS — SIGNIFICANT CHANGE UP
CREAT SERPL-MCNC: 0.7 MG/DL — SIGNIFICANT CHANGE UP (ref 0.7–1.5)
DIFF PNL FLD: NEGATIVE — SIGNIFICANT CHANGE UP
EOSINOPHIL # BLD AUTO: 0.1 K/UL — SIGNIFICANT CHANGE UP (ref 0–0.7)
EOSINOPHIL NFR BLD AUTO: 0.9 % — SIGNIFICANT CHANGE UP (ref 0–8)
EPI CELLS # UR: 0 /HPF — SIGNIFICANT CHANGE UP (ref 0–5)
GLUCOSE SERPL-MCNC: 116 MG/DL — HIGH (ref 70–99)
GLUCOSE UR QL: NEGATIVE — SIGNIFICANT CHANGE UP
HCT VFR BLD CALC: 42.2 % — SIGNIFICANT CHANGE UP (ref 37–47)
HGB BLD-MCNC: 13.6 G/DL — SIGNIFICANT CHANGE UP (ref 12–16)
HYALINE CASTS # UR AUTO: 1 /LPF — SIGNIFICANT CHANGE UP (ref 0–7)
IMM GRANULOCYTES NFR BLD AUTO: 0.5 % — HIGH (ref 0.1–0.3)
INR BLD: 0.97 RATIO — SIGNIFICANT CHANGE UP (ref 0.65–1.3)
KETONES UR-MCNC: NEGATIVE — SIGNIFICANT CHANGE UP
LEUKOCYTE ESTERASE UR-ACNC: ABNORMAL
LYMPHOCYTES # BLD AUTO: 1.56 K/UL — SIGNIFICANT CHANGE UP (ref 1.2–3.4)
LYMPHOCYTES # BLD AUTO: 14.2 % — LOW (ref 20.5–51.1)
MCHC RBC-ENTMCNC: 29.3 PG — SIGNIFICANT CHANGE UP (ref 27–31)
MCHC RBC-ENTMCNC: 32.2 G/DL — SIGNIFICANT CHANGE UP (ref 32–37)
MCV RBC AUTO: 90.9 FL — SIGNIFICANT CHANGE UP (ref 81–99)
MONOCYTES # BLD AUTO: 0.93 K/UL — HIGH (ref 0.1–0.6)
MONOCYTES NFR BLD AUTO: 8.4 % — SIGNIFICANT CHANGE UP (ref 1.7–9.3)
NEUTROPHILS # BLD AUTO: 8.3 K/UL — HIGH (ref 1.4–6.5)
NEUTROPHILS NFR BLD AUTO: 75.3 % — HIGH (ref 42.2–75.2)
NITRITE UR-MCNC: NEGATIVE — SIGNIFICANT CHANGE UP
NRBC # BLD: 0 /100 WBCS — SIGNIFICANT CHANGE UP (ref 0–0)
PH UR: 7 — SIGNIFICANT CHANGE UP (ref 5–8)
PLATELET # BLD AUTO: 275 K/UL — SIGNIFICANT CHANGE UP (ref 130–400)
POTASSIUM SERPL-MCNC: 3.7 MMOL/L — SIGNIFICANT CHANGE UP (ref 3.5–5)
POTASSIUM SERPL-SCNC: 3.7 MMOL/L — SIGNIFICANT CHANGE UP (ref 3.5–5)
PROT SERPL-MCNC: 7.9 G/DL — SIGNIFICANT CHANGE UP (ref 6–8)
PROT UR-MCNC: SIGNIFICANT CHANGE UP
PROTHROM AB SERPL-ACNC: 11.2 SEC — SIGNIFICANT CHANGE UP (ref 9.95–12.87)
RBC # BLD: 4.64 M/UL — SIGNIFICANT CHANGE UP (ref 4.2–5.4)
RBC # FLD: 12.9 % — SIGNIFICANT CHANGE UP (ref 11.5–14.5)
RBC CASTS # UR COMP ASSIST: 8 /HPF — HIGH (ref 0–4)
SODIUM SERPL-SCNC: 143 MMOL/L — SIGNIFICANT CHANGE UP (ref 135–146)
SP GR SPEC: 1.01 — LOW (ref 1.01–1.02)
UROBILINOGEN FLD QL: SIGNIFICANT CHANGE UP
WBC # BLD: 11.02 K/UL — HIGH (ref 4.8–10.8)
WBC # FLD AUTO: 11.02 K/UL — HIGH (ref 4.8–10.8)
WBC UR QL: 3 /HPF — SIGNIFICANT CHANGE UP (ref 0–5)

## 2019-08-14 PROCEDURE — 93010 ELECTROCARDIOGRAM REPORT: CPT

## 2019-08-14 RX ORDER — LOSARTAN POTASSIUM 100 MG/1
1 TABLET, FILM COATED ORAL
Qty: 0 | Refills: 0 | DISCHARGE

## 2019-08-14 NOTE — H&P PST ADULT - HISTORY OF PRESENT ILLNESS
75 y/o female scheduled for right reconstructed breast revision and implant exchange  pt recently has b/l breast implants for h/o breast ca   her right breast implant has moved upward in chest wall requiring revision  reports no c/o cp,sob,palpitations,cough or dysuria  1-2 fos without sob

## 2019-08-20 ENCOUNTER — OUTPATIENT (OUTPATIENT)
Dept: OUTPATIENT SERVICES | Facility: HOSPITAL | Age: 74
LOS: 1 days | Discharge: HOME | End: 2019-08-20

## 2019-08-20 ENCOUNTER — APPOINTMENT (OUTPATIENT)
Dept: OBGYN | Facility: CLINIC | Age: 74
End: 2019-08-20
Payer: MEDICARE

## 2019-08-20 VITALS — DIASTOLIC BLOOD PRESSURE: 90 MMHG | SYSTOLIC BLOOD PRESSURE: 130 MMHG | WEIGHT: 165 LBS | BODY MASS INDEX: 30.18 KG/M2

## 2019-08-20 DIAGNOSIS — N76.2 ACUTE VULVITIS: ICD-10-CM

## 2019-08-20 DIAGNOSIS — L29.2 PRURITUS VULVAE: ICD-10-CM

## 2019-08-20 DIAGNOSIS — Z90.49 ACQUIRED ABSENCE OF OTHER SPECIFIED PARTS OF DIGESTIVE TRACT: Chronic | ICD-10-CM

## 2019-08-20 DIAGNOSIS — Z90.710 ACQUIRED ABSENCE OF BOTH CERVIX AND UTERUS: Chronic | ICD-10-CM

## 2019-08-20 DIAGNOSIS — Z98.49 CATARACT EXTRACTION STATUS, UNSPECIFIED EYE: Chronic | ICD-10-CM

## 2019-08-20 DIAGNOSIS — Z90.13 ACQUIRED ABSENCE OF BILATERAL BREASTS AND NIPPLES: Chronic | ICD-10-CM

## 2019-08-20 DIAGNOSIS — N76.3 SUBACUTE AND CHRONIC VULVITIS: ICD-10-CM

## 2019-08-20 DIAGNOSIS — Z98.82 BREAST IMPLANT STATUS: Chronic | ICD-10-CM

## 2019-08-20 PROCEDURE — 99213 OFFICE O/P EST LOW 20 MIN: CPT

## 2019-08-20 RX ORDER — DOXYCYCLINE HYCLATE 100 MG/1
100 CAPSULE ORAL
Qty: 14 | Refills: 0 | Status: COMPLETED | COMMUNITY
Start: 2019-07-09

## 2019-08-20 NOTE — PHYSICAL EXAM
[Vulvar Atrophy] : vulvar atrophy [Vulvitis] : vulvitis [Normal] : uterus [No Bleeding] : there was no active vaginal bleeding [Atrophy] : atrophy [Uterine Adnexae] : were not tender and not enlarged

## 2019-08-21 DIAGNOSIS — Z78.0 ASYMPTOMATIC MENOPAUSAL STATE: ICD-10-CM

## 2019-08-21 DIAGNOSIS — Z13.820 ENCOUNTER FOR SCREENING FOR OSTEOPOROSIS: ICD-10-CM

## 2019-08-21 DIAGNOSIS — M89.9 DISORDER OF BONE, UNSPECIFIED: ICD-10-CM

## 2019-08-22 ENCOUNTER — APPOINTMENT (OUTPATIENT)
Dept: PLASTIC SURGERY | Facility: AMBULATORY SURGERY CENTER | Age: 74
End: 2019-08-22
Payer: MEDICARE

## 2019-08-22 ENCOUNTER — OUTPATIENT (OUTPATIENT)
Dept: OUTPATIENT SERVICES | Facility: HOSPITAL | Age: 74
LOS: 1 days | Discharge: HOME | End: 2019-08-22
Payer: MEDICARE

## 2019-08-22 ENCOUNTER — RESULT REVIEW (OUTPATIENT)
Age: 74
End: 2019-08-22

## 2019-08-22 VITALS
OXYGEN SATURATION: 98 % | TEMPERATURE: 98 F | RESPIRATION RATE: 16 BRPM | SYSTOLIC BLOOD PRESSURE: 186 MMHG | HEIGHT: 62 IN | WEIGHT: 164.91 LBS | DIASTOLIC BLOOD PRESSURE: 86 MMHG | HEART RATE: 79 BPM

## 2019-08-22 VITALS
DIASTOLIC BLOOD PRESSURE: 64 MMHG | RESPIRATION RATE: 23 BRPM | HEART RATE: 70 BPM | TEMPERATURE: 98 F | OXYGEN SATURATION: 96 % | SYSTOLIC BLOOD PRESSURE: 138 MMHG

## 2019-08-22 DIAGNOSIS — Z90.49 ACQUIRED ABSENCE OF OTHER SPECIFIED PARTS OF DIGESTIVE TRACT: Chronic | ICD-10-CM

## 2019-08-22 DIAGNOSIS — N65.1 DISPROPORTION OF RECONSTRUCTED BREAST: ICD-10-CM

## 2019-08-22 DIAGNOSIS — T85.44XA CAPSULAR CONTRACTURE OF BREAST IMPLANT, INITIAL ENCOUNTER: ICD-10-CM

## 2019-08-22 DIAGNOSIS — Z90.13 ACQUIRED ABSENCE OF BILATERAL BREASTS AND NIPPLES: ICD-10-CM

## 2019-08-22 DIAGNOSIS — Z88.2 ALLERGY STATUS TO SULFONAMIDES: ICD-10-CM

## 2019-08-22 DIAGNOSIS — Z90.710 ACQUIRED ABSENCE OF BOTH CERVIX AND UTERUS: Chronic | ICD-10-CM

## 2019-08-22 DIAGNOSIS — Z85.3 PERSONAL HISTORY OF MALIGNANT NEOPLASM OF BREAST: ICD-10-CM

## 2019-08-22 DIAGNOSIS — Z98.49 CATARACT EXTRACTION STATUS, UNSPECIFIED EYE: Chronic | ICD-10-CM
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
## 2019-08-22 DIAGNOSIS — Z98.82 BREAST IMPLANT STATUS: Chronic | ICD-10-CM

## 2019-08-22 DIAGNOSIS — Z90.13 ACQUIRED ABSENCE OF BILATERAL BREASTS AND NIPPLES: Chronic | ICD-10-CM
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
## 2019-08-22 DIAGNOSIS — I10 ESSENTIAL (PRIMARY) HYPERTENSION: ICD-10-CM

## 2019-08-22 DIAGNOSIS — E78.5 HYPERLIPIDEMIA, UNSPECIFIED: ICD-10-CM

## 2019-08-22 PROCEDURE — 19380 REVJ RECONSTRUCTED BREAST: CPT | Mod: 58,59,RT

## 2019-08-22 PROCEDURE — 19371 PERI-IMPLT CAPSLC BRST COMPL: CPT | Mod: 58,RT

## 2019-08-22 PROCEDURE — 88304 TISSUE EXAM BY PATHOLOGIST: CPT | Mod: 26

## 2019-08-22 RX ORDER — HYDROMORPHONE HYDROCHLORIDE 2 MG/ML
0.5 INJECTION INTRAMUSCULAR; INTRAVENOUS; SUBCUTANEOUS
Refills: 0 | Status: DISCONTINUED | OUTPATIENT
Start: 2019-08-22 | End: 2019-08-22

## 2019-08-22 RX ORDER — DILTIAZEM HCL 120 MG
1 CAPSULE, EXT RELEASE 24 HR ORAL
Qty: 0 | Refills: 0 | DISCHARGE

## 2019-08-22 RX ORDER — L.ACIDOPH/B.ANIMALIS/B.LONGUM 15B CELL
1 CAPSULE ORAL
Qty: 0 | Refills: 0 | DISCHARGE

## 2019-08-22 RX ORDER — LOSARTAN POTASSIUM 100 MG/1
1 TABLET, FILM COATED ORAL
Qty: 0 | Refills: 0 | DISCHARGE

## 2019-08-22 RX ORDER — DENOSUMAB 60 MG/ML
1 INJECTION SUBCUTANEOUS
Qty: 0 | Refills: 0 | DISCHARGE

## 2019-08-22 RX ORDER — SODIUM CHLORIDE 9 MG/ML
1000 INJECTION, SOLUTION INTRAVENOUS
Refills: 0 | Status: DISCONTINUED | OUTPATIENT
Start: 2019-08-22 | End: 2019-09-14

## 2019-08-22 RX ORDER — TRAMADOL HYDROCHLORIDE 50 MG/1
1 TABLET ORAL
Qty: 28 | Refills: 0
Start: 2019-08-22 | End: 2019-08-28

## 2019-08-22 RX ORDER — EZETIMIBE 10 MG/1
1 TABLET ORAL
Qty: 0 | Refills: 0 | DISCHARGE

## 2019-08-22 RX ORDER — ANASTROZOLE 1 MG/1
1 TABLET ORAL
Qty: 0 | Refills: 0 | DISCHARGE

## 2019-08-22 RX ORDER — OXYCODONE AND ACETAMINOPHEN 5; 325 MG/1; MG/1
1 TABLET ORAL ONCE
Refills: 0 | Status: DISCONTINUED | OUTPATIENT
Start: 2019-08-22 | End: 2019-08-22

## 2019-08-22 RX ORDER — ASCORBIC ACID 60 MG
1 TABLET,CHEWABLE ORAL
Qty: 0 | Refills: 0 | DISCHARGE

## 2019-08-22 RX ORDER — ASPIRIN/CALCIUM CARB/MAGNESIUM 324 MG
1 TABLET ORAL
Qty: 0 | Refills: 0 | DISCHARGE

## 2019-08-22 RX ORDER — ONDANSETRON 8 MG/1
4 TABLET, FILM COATED ORAL ONCE
Refills: 0 | Status: DISCONTINUED | OUTPATIENT
Start: 2019-08-22 | End: 2019-09-14

## 2019-08-22 RX ADMIN — SODIUM CHLORIDE 100 MILLILITER(S): 9 INJECTION, SOLUTION INTRAVENOUS at 09:47

## 2019-08-22 NOTE — ASU DISCHARGE PLAN (ADULT/PEDIATRIC) - ASU DC SPECIAL INSTRUCTIONSFT
Patient Name: WOODY RINCON  MRN: [id number]  74y Female  [facility]    Post Operative Instructions  Please see wound care and activity instructions as documented above.    Pain medication prescribed:   traMADol 50 mg oral tablet: 1 tab(s) orally every 6 hours, As Needed -for severe pain MDD:4 tabs     - Please take pain medications prescribed only as needed for severe pain, otherwise you may take Tylenol, 650mg every 6 hours as needed and/or Motrin, 600mg every 6 hours as needed for mild pain control    Antibiotics prescribed:   doxycycline hyclate 100 mg oral capsule: 1 cap(s) orally every 12 hours MDD:2 tablets     - Please take all antibiotics as prescribed, please complete the antibiotic course prescribed    Additional Instructions:  Please use the surgical bra at all times for added support.   Please call the clinic and confirm your appointment for follow up, see the follow up instructions and the physician's office number  below. Please call the clinic for any questions or concerns.    08-22-19 @ 09:35

## 2019-08-22 NOTE — ASU DISCHARGE PLAN (ADULT/PEDIATRIC) - BATHING
Shower only/You may sponge bath tonight, shower in 2-3 days, avoid direct exposure to water stream, do not submerge in water, blot dry

## 2019-08-22 NOTE — ASU DISCHARGE PLAN (ADULT/PEDIATRIC) - DRIVING
Please no driving or operating heavy machinery while taking prescribed narcotic pain medications/No...

## 2019-08-22 NOTE — BRIEF OPERATIVE NOTE - OPERATION/FINDINGS
capsular contracture of Right breast implant  Partial capsulectomy capsular contracture of Right breast implant  Partial capsulectomy  Implant: Graciela Galicia Soft Touch 405cc [employer]

## 2019-08-22 NOTE — BRIEF OPERATIVE NOTE - NSICDXBRIEFPROCEDURE_GEN_ALL_CORE_FT
PROCEDURES:  Insertion, breast implant, right 22-Aug-2019 09:21:12  Eduardo Clrake  Capsulectomy of right breast with removal of implant 22-Aug-2019 09:20:58  Eduardo Clarke

## 2019-08-22 NOTE — ASU DISCHARGE PLAN (ADULT/PEDIATRIC) - ACTIVITY LEVEL
No excercise/No sports/gym/No heavy lifting/Light daily activity as tolerated is permitted, Please avoid any heavy lifting >10-15lbs, strenuous physical activity, straining, or heavy exercise. Please no reaching, pulling or grabbing with Right arm

## 2019-08-22 NOTE — ASU DISCHARGE PLAN (ADULT/PEDIATRIC) - COMMENTS
To schedule a follow-up appointment for your post-operative visit, please call the surgeon's office at the number provided above. Please see the above instructions indicating when you should follow up with your surgeon. If you have already scheduled a post-operative visit with your surgeon, then please follow up as scheduled.

## 2019-08-22 NOTE — ASU DISCHARGE PLAN (ADULT/PEDIATRIC) - CARE PROVIDER_API CALL
Najma Nathan)  [facility]  [address] [place] [address], [address]  [place] [address]  Phone: [phone]  Fax: [phone]  Follow Up Time: 1 week

## 2019-08-22 NOTE — CHART NOTE - NSCHARTNOTEFT_GEN_A_CORE
PACU ANESTHESIA ADMISSION NOTE      Procedure: Insertion, breast implant, right  Capsulectomy of right breast with removal of implant    Post op diagnosis:  Capsular contracture of breast implant      ____  Intubated  TV:______       Rate: ______      FiO2: ______    _x___  Patent Airway    _x___  Full return of protective reflexes    _x___  Full recovery from anesthesia / back to baseline status    Vitals  SPO2:-96% on 2l nc  HR:-81  RR:-12  B.P:-169/77  TEMP:-98.2    Mental Status:  _x___ Awake   ___x_ Alert   _____ Drowsy   _____ Sedated    Nausea/Vomiting:  _x___  NO       ______Yes,   See Post - Op Orders         Pain Scale (0-10):  __0___    Treatment: _x___ None    __x__ See Post - Op/PCA Orders    Post - Operative Fluids:   ___ Oral   ____x See Post - Op Orders    Plan: Discharge:   _x___Home       ____Floor     _____Critical Care    _____  Other:_________________    Comments:  Report endorsed to RN in pacu  Vitals stable  No anesthesia issues or complications noted.  Discharge to patient to home when criteria met.

## 2019-08-22 NOTE — PRE-ANESTHESIA EVALUATION ADULT - LAST ECHOCARDIOGRAM
LEFT VOICEMAIL FOR PATIENT NEEDING VERIFICATION ON WHICH PHARMACY TO SEND THE MEDICATION TO  ----- Message from Elaine Herzog MA sent at 12/18/2017 11:16 AM EST -----  Contact: PT  PT NEEDS RX REFILL FOR LYSINOPRIL 40 MG QTY 90          PT PHONE NUMBER IS  [phone]     2018 echo - EF 66 %

## 2019-08-23 ENCOUNTER — MESSAGE (OUTPATIENT)
Age: 74
End: 2019-08-23

## 2019-08-26 LAB — SURGICAL PATHOLOGY STUDY: SIGNIFICANT CHANGE UP

## 2019-08-29 ENCOUNTER — APPOINTMENT (OUTPATIENT)
Dept: PLASTIC SURGERY | Facility: CLINIC | Age: 74
End: 2019-08-29
Payer: MEDICARE

## 2019-08-29 PROCEDURE — 99024 POSTOP FOLLOW-UP VISIT: CPT

## 2019-08-29 NOTE — PHYSICAL EXAM
[de-identified] : well-developed female, NAD [de-identified] : bilateral breast implants soft and in good position, incisions healing, no evidence of cellulitis\par right breast with improved superior fullness, implant soft, no ecchymosis; no palpable fluid collection\par left breast with early capsular contracture and banding\par right breast  volume > left breast

## 2019-08-29 NOTE — HISTORY OF PRESENT ILLNESS
[FreeTextEntry1] : 72 yo with history of bilateral breast cancer (dx 2015-left stage T2N1 invasive carcinoma, right- T1CN) invasive carcinoma, ER/RI positive) s/p left MRM and right simple mastectomy and SLN followed by subpectoral TE placement, ?mesh, and silicone implant exchange with Dr. Brown at [facility]. Pt also underwent CTX completed Dec/16 and XRT completed in 2016. Denies wound healing issues. Pt now c/o dissatisfaction with cosmetic result as well as left breast discomfort. Sought second opinion with Dr. Brown in [place]. \par \par Pt was also recently diagnosed with hypercalcemia on lab work, pending endocrinology consultation for further workup. Nonsmoker\par \par Interval hx (6/7/19): Here for follow up to confirm her breast reconstruction revision plan. Pt just returned from [place] were she was for the past 6 months. Her  is present for the encounter. She reports that her hypercalcemia is now stable and has be taken off of calcium therapy (Dr. Lama). She was evaluated by Dr. Renae who reports no evidence of breast disease. Patient is here to confirm her breast revision surgical plan. Pt is still interested in less invasive breast reconstructive option. \par \par Interval hx (7/12/19): Pt presents for bandage change- POD #3 s/p bilateral breast implant exchange, AlloDerm placement, and capsulectomy. C/o right breast drainage saturating bandage. Denies pain, f/c, or purulence. \par \par Interval hx (7/15/19). Patient presents today POD#6 s/p bilateral breast implant exchange, AlloDerm placement, and capsulectomy. Doing well and very pleased with her result reporting improvement in pain, discomfort and overall aesthetics of her breasts. Compliant with oral antibiotics and surgical bra. Denies any fever, chills, drainage or bleeding. \par \par interval hx (7/22/19): Here for POD#13 s/p bilateral breast implant exchange.  Doing well.  Very happy with results.  Denies fevers, chills, redness.  completed course of PO abx.  compliant with bandeau and surgical bra use.\par \par Interval hx (8/2/19):  Here for POD#24 s/p bilateral breast implant exchange.  Went for right breast sono with no evidence of seroma.  Denies fevers, chills, redness.  c/o of right breast superior pole breast asymmetry and difference in volume.  Also c/o left breast soft tissue banding returning.\par \par Interval hx (8/29/19): Pt presents for 1st postop visit- POD #7 s/p revision of right breast reconstruction, capsulectomy and implant exchange. Reports she feels this is an improvement. Denies pain or f/c.

## 2019-08-29 NOTE — ASSESSMENT
[FreeTextEntry1] : 74 y/o F with hx of BL Breast CA s/p Chemotherapy and left breast XRT, s/p TE and silicone implant reconstruction (submuscular) with bilateral symptomatic capsular contracture.\par s/p bilateral breast implant exchange, AlloDerm placement, and capsulectomy 7/9/19; was unhappy with breast asymmetry\par \par Now POD #7 s/p revision of right breast reconstruction, capsulectomy and implant exchange, doing well\par \par -Bandages changed\par -Bandeau reapplied; continue at all times\par -Continue supportive bra\par -Renewed Singulair\par -Activity restrictions reviewed\par -F/u 1 week with Dr. Nathan

## 2019-09-05 ENCOUNTER — APPOINTMENT (OUTPATIENT)
Dept: PLASTIC SURGERY | Facility: CLINIC | Age: 74
End: 2019-09-05
Payer: MEDICARE

## 2019-09-05 PROCEDURE — 99024 POSTOP FOLLOW-UP VISIT: CPT

## 2019-09-05 NOTE — PHYSICAL EXAM
[de-identified] : well-developed female, NAD [de-identified] : bilateral breast implants soft and in good position, incisions healing, no evidence of cellulitis\par right breast with improved superior fullness, implant soft, nontender capsular contracture grade 2, no ecchymosis; no palpable fluid collection\par left breast with early capsular contracture and banding, nontender\par right breast  volume > left breast

## 2019-09-05 NOTE — ASSESSMENT
[FreeTextEntry1] : 72 y/o F with hx of BL Breast CA s/p Chemotherapy and left breast XRT, s/p TE and silicone implant reconstruction (submuscular) with bilateral symptomatic capsular contracture.\par s/p bilateral breast implant exchange, AlloDerm placement, and capsulectomy 7/9/19; was unhappy with breast asymmetry\par \par Now POD #14 s/p revision of right breast reconstruction, capsulectomy and implant exchange, doing well\par \par -steristrips removed\par -Bandeau during daytime only x 2 weeks\par -Continue supportive bra\par -Will follow up CMP results before patient to start Singulair\par -Activity restrictions reviewed\par -F/u 4 weeks\par \par Photos were taken

## 2019-09-05 NOTE — HISTORY OF PRESENT ILLNESS
[FreeTextEntry1] : 74 yo with history of bilateral breast cancer (dx 2015-left stage T2N1 invasive carcinoma, right- T1CN) invasive carcinoma, ER/VA positive) s/p left MRM and right simple mastectomy and SLN followed by subpectoral TE placement, ?mesh, and silicone implant exchange with Dr. Brown at [facility]. Pt also underwent CTX completed Dec/16 and XRT completed in 2016. Denies wound healing issues. Pt now c/o dissatisfaction with cosmetic result as well as left breast discomfort. Sought second opinion with Dr. Brown in [place]. \par \par Pt was also recently diagnosed with hypercalcemia on lab work, pending endocrinology consultation for further workup. Nonsmoker\par \par Interval hx (6/7/19): Here for follow up to confirm her breast reconstruction revision plan. Pt just returned from [place] were she was for the past 6 months. Her  is present for the encounter. She reports that her hypercalcemia is now stable and has be taken off of calcium therapy (Dr. Lama). She was evaluated by Dr. Renae who reports no evidence of breast disease. Patient is here to confirm her breast revision surgical plan. Pt is still interested in less invasive breast reconstructive option. \par \par Interval hx (7/12/19): Pt presents for bandage change- POD #3 s/p bilateral breast implant exchange, AlloDerm placement, and capsulectomy. C/o right breast drainage saturating bandage. Denies pain, f/c, or purulence. \par \par Interval hx (7/15/19). Patient presents today POD#6 s/p bilateral breast implant exchange, AlloDerm placement, and capsulectomy. Doing well and very pleased with her result reporting improvement in pain, discomfort and overall aesthetics of her breasts. Compliant with oral antibiotics and surgical bra. Denies any fever, chills, drainage or bleeding. \par \par interval hx (7/22/19): Here for POD#13 s/p bilateral breast implant exchange.  Doing well.  Very happy with results.  Denies fevers, chills, redness.  completed course of PO abx.  compliant with bandeau and surgical bra use.\par \par Interval hx (8/2/19):  Here for POD#24 s/p bilateral breast implant exchange.  Went for right breast sono with no evidence of seroma.  Denies fevers, chills, redness.  c/o of right breast superior pole breast asymmetry and difference in volume.  Also c/o left breast soft tissue banding returning.\par \par Interval hx (8/29/19): Pt presents for 1st postop visit- POD #7 s/p revision of right breast reconstruction, capsulectomy and implant exchange. Reports she feels this is an improvement. Denies pain or f/c.\par \par Interval hx (9/5/19): 2nd  post-op visit POD#14 s/p revision of right breast reconstruction, partial capuslectomy and implant exchange. pt continue to feel improvement of right breast; no longer has right breast tightness.  Happy with result

## 2019-10-11 ENCOUNTER — APPOINTMENT (OUTPATIENT)
Dept: PLASTIC SURGERY | Facility: CLINIC | Age: 74
End: 2019-10-11
Payer: MEDICARE

## 2019-10-11 DIAGNOSIS — T85.44XA CAPSULAR CONTRACTURE OF BREAST IMPLANT, INITIAL ENCOUNTER: ICD-10-CM

## 2019-10-11 PROCEDURE — 99024 POSTOP FOLLOW-UP VISIT: CPT

## 2019-10-11 NOTE — HISTORY OF PRESENT ILLNESS
[FreeTextEntry1] : 72 yo with history of bilateral breast cancer (dx 2015-left stage T2N1 invasive carcinoma, right- T1CN) invasive carcinoma, ER/ND positive) s/p left MRM and right simple mastectomy and SLN followed by subpectoral TE placement, ?mesh, and silicone implant exchange with Dr. Brown at [facility]. Pt also underwent CTX completed Dec/16 and XRT completed in 2016. Denies wound healing issues. Pt now c/o dissatisfaction with cosmetic result as well as left breast discomfort. Sought second opinion with Dr. Brown in [place]. \par \par Pt was also recently diagnosed with hypercalcemia on lab work, pending endocrinology consultation for further workup. Nonsmoker\par \par Interval hx (6/7/19): Here for follow up to confirm her breast reconstruction revision plan. Pt just returned from [place] were she was for the past 6 months. Her  is present for the encounter. She reports that her hypercalcemia is now stable and has be taken off of calcium therapy (Dr. Lama). She was evaluated by Dr. Renae who reports no evidence of breast disease. Patient is here to confirm her breast revision surgical plan. Pt is still interested in less invasive breast reconstructive option. \par \par Interval hx (7/12/19): Pt presents for bandage change- POD #3 s/p bilateral breast implant exchange, AlloDerm placement, and capsulectomy. C/o right breast drainage saturating bandage. Denies pain, f/c, or purulence. \par \par Interval hx (7/15/19). Patient presents today POD#6 s/p bilateral breast implant exchange, AlloDerm placement, and capsulectomy. Doing well and very pleased with her result reporting improvement in pain, discomfort and overall aesthetics of her breasts. Compliant with oral antibiotics and surgical bra. Denies any fever, chills, drainage or bleeding. \par \par interval hx (7/22/19): Here for POD#13 s/p bilateral breast implant exchange.  Doing well.  Very happy with results.  Denies fevers, chills, redness.  completed course of PO abx.  compliant with bandeau and surgical bra use.\par \par Interval hx (8/2/19):  Here for POD#24 s/p bilateral breast implant exchange.  Went for right breast sono with no evidence of seroma.  Denies fevers, chills, redness.  c/o of right breast superior pole breast asymmetry and difference in volume.  Also c/o left breast soft tissue banding returning.\par \par Interval hx (8/29/19): Pt presents for 1st postop visit- POD #7 s/p revision of right breast reconstruction, capsulectomy and implant exchange. Reports she feels this is an improvement. Denies pain or f/c.\par \par Interval hx (9/5/19): 2nd  post-op visit POD#14 s/p revision of right breast reconstruction, partial capuslectomy and implant exchange. pt continue to feel improvement of right breast; no longer has right breast tightness.  Happy with result\par \par Interval hx (10/11/19): 7 weeks s/p right breast reconstruction, partial capsulectomy and implant exchange.  Compliant with bandeau use.  Also taken Singulair as instructed.  Feels improvement of capsular contracture in both breasts (R>L).  She reports the right breast implant has improved position as well.  She is happy with her results.

## 2019-10-11 NOTE — PHYSICAL EXAM
[de-identified] : well-developed female, NAD [de-identified] : bilateral breast implants soft and in good position, incisions wel heal, no evidence of cellulitis\par right breast with improved superior fullness, implant in better position, soft, nontender capsular contracture grade 2, no erythema or palpable fluid collection\par left breast with stable early capsular contracture and banding, nontender; no supple than last visit\par right breast  volume > left breast

## 2019-10-11 NOTE — ASSESSMENT
[FreeTextEntry1] : 72 y/o F with hx of BL Breast CA s/p Chemotherapy and left breast XRT, s/p TE and silicone implant reconstruction (submuscular) with bilateral symptomatic capsular contracture.\par s/p bilateral breast implant exchange, AlloDerm placement, and capsulectomy 7/9/19; was unhappy with breast asymmetry\par \par Now POD 7 weeks s/p revision of right breast reconstruction, capsulectomy and implant exchange, doing well\par \par -Continue regular bra\par -alk phos 145, will repeat CMP and observe; after completion of Singulair\par -If no AST/ALT elevation then start 4th course of Singulair\par -May resume all physical activity\par -All questions were answered\par -F/u in 6-7 months\par \par Photos were taken

## 2019-10-17 ENCOUNTER — OUTPATIENT (OUTPATIENT)
Dept: OUTPATIENT SERVICES | Facility: HOSPITAL | Age: 74
LOS: 1 days | Discharge: HOME | End: 2019-10-17

## 2019-10-17 DIAGNOSIS — Z90.49 ACQUIRED ABSENCE OF OTHER SPECIFIED PARTS OF DIGESTIVE TRACT: Chronic | ICD-10-CM

## 2019-10-17 DIAGNOSIS — Z90.710 ACQUIRED ABSENCE OF BOTH CERVIX AND UTERUS: Chronic | ICD-10-CM

## 2019-10-17 DIAGNOSIS — Z98.82 BREAST IMPLANT STATUS: Chronic | ICD-10-CM

## 2019-10-17 DIAGNOSIS — Z90.13 ACQUIRED ABSENCE OF BILATERAL BREASTS AND NIPPLES: Chronic | ICD-10-CM

## 2019-10-17 DIAGNOSIS — Z98.49 CATARACT EXTRACTION STATUS, UNSPECIFIED EYE: Chronic | ICD-10-CM

## 2019-10-17 DIAGNOSIS — B35.6 TINEA CRURIS: ICD-10-CM

## 2019-10-21 ENCOUNTER — APPOINTMENT (OUTPATIENT)
Dept: SURGERY | Facility: CLINIC | Age: 74
End: 2019-10-21
Payer: MEDICARE

## 2019-10-21 VITALS
BODY MASS INDEX: 30.18 KG/M2 | SYSTOLIC BLOOD PRESSURE: 156 MMHG | HEIGHT: 62 IN | DIASTOLIC BLOOD PRESSURE: 89 MMHG | WEIGHT: 164 LBS

## 2019-10-21 PROCEDURE — 99212 OFFICE O/P EST SF 10 MIN: CPT

## 2019-10-21 NOTE — ASSESSMENT
[FreeTextEntry1] : Benign bilateral breast cancer surveillance examination, now over 4 years postop. She can return to annual screening here and will make the appropriate appointment. All questions were answered and she understands and agrees.

## 2019-10-21 NOTE — HISTORY OF PRESENT ILLNESS
[de-identified] : The patient returns with her  for regular surveillance, following a left modified radical mastectomy and right simple mastectomy with sentinel node biopsy in July of 2015. She had immediate reconstruction with implants and just recently had the surgery revised and the implants replaced by Dr. Nathan.  She has done well in this regard and has no specific complaints at this time.  T2N1 left/T1cN0 right

## 2019-10-21 NOTE — PHYSICAL EXAM
[de-identified] : no adenopathy [de-identified] : No suspicious skin changes bilaterally. Implants appeared intact and symmetrical no evidence of locally recurrent disease bilaterally. No axillary adenopathy bilaterally

## 2019-12-27 NOTE — REASON FOR VISIT
FOLLOW UP OFFICE VISIT    Patient ID: Elena is a 51 year old female.    1. Obstructive sleep apnea (adult) (pediatric)        Chief Complaint   Patient presents with   • Sleep Problem     overdue fu        HPI:  Ms. Cerda returns today for follow-up of her obstructive sleep apnea.  I last saw her in 2017.  She continues to use her CPAP on a regular basis and is not having any problems with it but states that she needs a new order sent for CPAP supplies at this time.  She denied any problems with daytime sleepiness at this point.  She denied any fever, chills, cough, upper respiratory symptoms, chest pain, wheezing, abdominal pain, nausea, vomiting, diarrhea, or pedal edema.    HISTORY:    Past Medical History:   Diagnosis Date   • Anemia    • Anxiety    • Depression    • Diverticulitis    • Hyperlipidemia    • Hypothyroidism    • OCD (obsessive compulsive disorder)    • Sleep apnea    • Tachycardia        Past Surgical History:   Procedure Laterality Date   • Cholecystectomy     • Gastric bypass     • Hernia repair     • Nissen fundoplication     • Polypectomy         Family History   Problem Relation Age of Onset   • Aneurysm Father         aortic   • Bleeding Disorder Father    • COPD Father    • Coronary Artery Disease Father    • Hypertension Father    • Sleep Apnea Father    • Asthma Sister        Social History     Tobacco Use   • Smoking status: Never Smoker   • Smokeless tobacco: Never Used   Substance Use Topics   • Alcohol use: Not Currently   • Drug use: Not Currently       MEDS:    Current Outpatient Medications   Medication Sig Dispense Refill   • ALPRAZolam (XANAX) 0.5 MG tablet Take 1 tablet by mouth 3 times daily as needed.     • ADDERALL XR 20 MG 24 hr capsule Take 20 mg by mouth daily.      • calcium carbonate-cholecalciferol 600-800 MG-UNIT tablet Take 1 tablet by mouth daily.     • cloNIDine (CATAPRES) 0.1 MG tablet Take 0.1 mg by mouth daily.      • VYVANSE 40 MG capsule Take 40 mg by  mouth every morning.      • topiramate (TOPAMAX) 100 MG tablet Take 100 mg by mouth daily.      • Potassium 99 MG Tab Take 99 mg by mouth daily.       No current facility-administered medications for this visit.        ALLERGY:    ALLERGIES:   Allergen Reactions   • Demerol Other (See Comments)   • Lisinopril Cough       ROS:    Review of Systems   Constitutional: Positive for fatigue. Negative for chills and fever.   Respiratory: Positive for cough and shortness of breath.         Pt states that she has a dry cough    Cardiovascular: Positive for leg swelling. Negative for chest pain and palpitations.   Neurological: Positive for headaches. Negative for dizziness.   Psychiatric/Behavioral: Negative for dysphoric mood and suicidal ideas. The patient is nervous/anxious.    All other systems reviewed and are negative.      VITALS:    Visit Vitals  /74 (BP Location: RUE - Right upper extremity, Patient Position: Sitting)   Pulse 129   Resp 18   Ht 5' 4\" (1.626 m)   Wt 123.8 kg (273 lb)   SpO2 99% Comment: RA   BMI 46.86 kg/m²       Physical Exam   Constitutional: She is oriented to person, place, and time. She appears well-developed.   obese   HENT:   Head: Normocephalic and atraumatic.   Right Ear: External ear normal.   Left Ear: External ear normal.   Mouth/Throat: Oropharynx is clear and moist.   Eyes: Pupils are equal, round, and reactive to light. Conjunctivae and EOM are normal.   Neck: Normal range of motion. Neck supple. No JVD present. No tracheal deviation present. No thyromegaly present.   Cardiovascular: Normal rate, regular rhythm, normal heart sounds and intact distal pulses. Exam reveals no gallop.   No murmur heard.  Pulmonary/Chest: Effort normal and breath sounds normal. No stridor. She has no wheezes. She has no rales.   Abdominal: Soft. Bowel sounds are normal. She exhibits no mass. There is no tenderness. Musculoskeletal:         General: No edema.     Lymphadenopathy:     She has no  cervical adenopathy.   Neurological: She is alert and oriented to person, place, and time.   Skin: Skin is warm and dry.   Nursing note and vitals reviewed.      Orders Placed This Encounter   • [facility]   • ALPRAZolam (XANAX) 0.5 MG tablet   • ADDERALL XR 20 MG 24 hr capsule   • calcium carbonate-cholecalciferol 600-800 MG-UNIT tablet   • cloNIDine (CATAPRES) 0.1 MG tablet   • VYVANSE 40 MG capsule   • topiramate (TOPAMAX) 100 MG tablet   • Potassium 99 MG Tab       Return in about 1 year (around 12/27/2020) for see APN.    Assessment/Plan:  1.  Obstructive sleep apnea- download from the patient's CPAP shows excellent compliance with good therapeutic efficacy on her current pressure settings of 6-11.  She is not having any significant problems with mask leak.  She should continue on the settings and we are sending an order for some flies to [facility].  I will have her follow-up in 1 year.    Electronically signed by: Ben Mcneal MD    Disclaimer Note: Your provider prepared this document using voice recognition technology.  In that case, if a word or phrase is confusing, or does not make sense, this is likely due to a recognition error within the program which was not discovered during the provider's review.  If you believe an error has occurred, please notify your provider's office at your earliest convenience, so we can correct any mistakes.   [Follow-Up: _____] : a [unfilled] follow-up visit

## 2020-02-05 ENCOUNTER — RECORD ABSTRACTING (OUTPATIENT)
Age: 75
End: 2020-02-05

## 2020-06-15 ENCOUNTER — APPOINTMENT (OUTPATIENT)
Dept: PLASTIC SURGERY | Facility: CLINIC | Age: 75
End: 2020-06-15
Payer: MEDICARE

## 2020-06-15 PROCEDURE — 99213 OFFICE O/P EST LOW 20 MIN: CPT

## 2020-06-15 NOTE — PHYSICAL EXAM
[de-identified] : well-developed female, NAD [de-identified] : bilateral breast implants soft and in good position, incisions well healed, no evidence of seroma\par right breast with improved superior fullness, implant in better position, soft, nontender capsular contracture grade 2, no erythema or palpable fluid collection\par left breast with grade 3 capsular contracture and pec/axillary fold banding, nontender; no supple than last visit\par right breast  volume > left breast

## 2020-06-15 NOTE — ASSESSMENT
[FreeTextEntry1] : 74 y/o F with hx of BL Breast CA s/p Chemotherapy and left breast XRT, s/p TE and silicone implant reconstruction (submuscular) with bilateral symptomatic capsular contracture.\par s/p bilateral breast implant exchange, AlloDerm placement, and capsulectomy 7/9/19; was unhappy with breast asymmetry\par \par 10 months s/p revision of right breast reconstruction, capsulectomy and implant exchange, doing well\par Recurrent XRT-related capsular contracture, grade 3\par \par \par -No indiction for further Singulair therapy\par -c/w implant massage and BUE ROM exercises\par -May resume all physical activity\par -All questions were answered\par -F/u in 4 months before return to [place]\par \par Photos were taken

## 2020-08-06 ENCOUNTER — APPOINTMENT (OUTPATIENT)
Dept: SURGERY | Facility: CLINIC | Age: 75
End: 2020-08-06
Payer: MEDICARE

## 2020-08-06 VITALS
WEIGHT: 164 LBS | HEART RATE: 95 BPM | HEIGHT: 62 IN | TEMPERATURE: 98 F | BODY MASS INDEX: 30.18 KG/M2 | DIASTOLIC BLOOD PRESSURE: 88 MMHG | SYSTOLIC BLOOD PRESSURE: 146 MMHG

## 2020-08-06 PROCEDURE — 99212 OFFICE O/P EST SF 10 MIN: CPT

## 2020-08-06 NOTE — PHYSICAL EXAM
[de-identified] : no adenopathy [de-identified] : No palpable masses or areas of suspicion in either breast region. The skin shows no significant thickening or retraction and the implants appear to be soft and mobile. No axillary adenopathy bilaterally.

## 2020-08-06 NOTE — PLAN
[FreeTextEntry1] : No imaging is needed; the patient will return here in one year or sooner as needed. She will continue to follow regularly with Dr. Nathan also.

## 2020-08-06 NOTE — HISTORY OF PRESENT ILLNESS
[de-identified] : The patient returns for her surveillance visit. She looks well overall. She has had multiple procedures with Dr. Nathan to correct problems related to her prior breast reconstructions and she says that she definitely feels better compared to previous but still has discomfort and some issues with the appearance of the reconstructions. She does not feel any new masses or areas that concern her.  She is now just over 5 years post-op.

## 2020-10-16 ENCOUNTER — APPOINTMENT (OUTPATIENT)
Dept: PLASTIC SURGERY | Facility: CLINIC | Age: 75
End: 2020-10-16
Payer: MEDICARE

## 2020-10-16 PROCEDURE — 99213 OFFICE O/P EST LOW 20 MIN: CPT

## 2020-10-16 NOTE — PHYSICAL EXAM
[de-identified] : bilateral breast implants soft and in good position, incisions well healed, no evidence of seroma\par right breast with improved superior fullness, implant in better position, soft, nontender capsular contracture grade 2, no erythema or palpable fluid collection\par left breast with grade 3 capsular contracture and pec/axillary fold banding, nontender; no supple than last visit; mastectomy flap telangiectasia (inferior_\par right breast  volume > left breast [de-identified] : well-developed female, NAD

## 2020-10-16 NOTE — HISTORY OF PRESENT ILLNESS
[FreeTextEntry1] : 74 yo with history of bilateral breast cancer (dx 2015-left stage T2N1 invasive carcinoma, right- T1CN) invasive carcinoma, ER/ID positive) s/p left MRM and right simple mastectomy and SLN followed by subpectoral TE placement, ?mesh, and silicone implant exchange with Dr. Brown at [facility]. Pt also underwent CTX completed Dec/16 and XRT completed in 2016. Denies wound healing issues. Pt now c/o dissatisfaction with cosmetic result as well as left breast discomfort. Sought second opinion with Dr. Brown in [place]. \par \par Pt was also recently diagnosed with hypercalcemia on lab work, pending endocrinology consultation for further workup. Nonsmoker\par \par Interval hx (6/7/19): Here for follow up to confirm her breast reconstruction revision plan. Pt just returned from [place] were she was for the past 6 months. Her  is present for the encounter. She reports that her hypercalcemia is now stable and has be taken off of calcium therapy (Dr. Lama). She was evaluated by Dr. Renae who reports no evidence of breast disease. Patient is here to confirm her breast revision surgical plan. Pt is still interested in less invasive breast reconstructive option. \par \par Interval hx (7/12/19): Pt presents for bandage change- POD #3 s/p bilateral breast implant exchange, AlloDerm placement, and capsulectomy. C/o right breast drainage saturating bandage. Denies pain, f/c, or purulence. \par \par Interval hx (7/15/19). Patient presents today POD#6 s/p bilateral breast implant exchange, AlloDerm placement, and capsulectomy. Doing well and very pleased with her result reporting improvement in pain, discomfort and overall aesthetics of her breasts. Compliant with oral antibiotics and surgical bra. Denies any fever, chills, drainage or bleeding. \par \par interval hx (7/22/19): Here for POD#13 s/p bilateral breast implant exchange.  Doing well.  Very happy with results.  Denies fevers, chills, redness.  completed course of PO abx.  compliant with bandeau and surgical bra use.\par \par Interval hx (8/2/19):  Here for POD#24 s/p bilateral breast implant exchange.  Went for right breast sono with no evidence of seroma.  Denies fevers, chills, redness.  c/o of right breast superior pole breast asymmetry and difference in volume.  Also c/o left breast soft tissue banding returning.\par \par Interval hx (8/29/19): Pt presents for 1st postop visit- POD #7 s/p revision of right breast reconstruction, capsulectomy and implant exchange. Reports she feels this is an improvement. Denies pain or f/c.\par \par Interval hx (9/5/19): 2nd  post-op visit POD#14 s/p revision of right breast reconstruction, partial capuslectomy and implant exchange. pt continue to feel improvement of right breast; no longer has right breast tightness.  Happy with result\par \par Interval hx (10/11/19): 7 weeks s/p right breast reconstruction, partial capsulectomy and implant exchange.  Compliant with bandeau use.  Also taken Singulair as instructed.  Feels improvement of capsular contracture in both breasts (R>L).  She reports the right breast implant has improved position as well.  She is happy with her results.  \par \par Interval hx (6/15/20):  10 month s/p right breast reconstruction revision and implant exchange. Patient completed the singulair 4th trial.  She reports that she had left breast discomfort with associated chest wall tightness.  She then started course of stretching therapy with improvement of implant pressure.  Patient happy with breast reconstruction revision.  Able to wear swimsuit\par \par Interval hx (10/16/20):  Here here 14 month s/p right breast reconstruction and implant exchange.

## 2020-10-16 NOTE — ASSESSMENT
[FreeTextEntry1] : 74 y/o F with hx of BL Breast CA s/p Chemotherapy and left breast XRT, s/p TE and silicone implant reconstruction (submuscular) with bilateral symptomatic capsular contracture.\par s/p bilateral breast implant exchange, AlloDerm placement, and capsulectomy 7/9/19; was unhappy with breast asymmetry\par \par 14 months s/p revision of right breast reconstruction, capsulectomy and implant exchange, doing well\par Recurrent XRT-related capsular contracture, grade 3 stable\par Pt comfortable with excellent BUE ROM\par \par -c/w implant massage and BUE ROM exercises\par -c/w all physical activity\par -Reviewed implant abx ppx\par -All questions were answered\par -No new PRS issues\par -Follow up in 1 year for implant check\par \par Photos were taken

## 2020-12-21 PROBLEM — N76.0 BACTERIAL VAGINOSIS: Status: RESOLVED | Noted: 2019-08-02 | Resolved: 2020-12-21

## 2021-09-20 ENCOUNTER — APPOINTMENT (OUTPATIENT)
Dept: PLASTIC SURGERY | Facility: CLINIC | Age: 76
End: 2021-09-20
Payer: MEDICARE

## 2021-09-20 PROCEDURE — 99212 OFFICE O/P EST SF 10 MIN: CPT

## 2021-09-20 NOTE — HISTORY OF PRESENT ILLNESS
[FreeTextEntry1] : 72 yo with history of bilateral breast cancer (dx 2015-left stage T2N1 invasive carcinoma, right- T1CN) invasive carcinoma, ER/MN positive) s/p left MRM and right simple mastectomy and SLN followed by subpectoral TE placement, ?mesh, and silicone implant exchange with Dr. Brown at [facility]. Pt also underwent CTX completed Dec/16 and XRT completed in 2016. Denies wound healing issues. Pt now c/o dissatisfaction with cosmetic result as well as left breast discomfort. Sought second opinion with Dr. Brown in [place]. \par \par Pt was also recently diagnosed with hypercalcemia on lab work, pending endocrinology consultation for further workup. Nonsmoker\par \par Interval hx (6/7/19): Here for follow up to confirm her breast reconstruction revision plan. Pt just returned from [place] were she was for the past 6 months. Her  is present for the encounter. She reports that her hypercalcemia is now stable and has be taken off of calcium therapy (Dr. Lama). She was evaluated by Dr. Renae who reports no evidence of breast disease. Patient is here to confirm her breast revision surgical plan. Pt is still interested in less invasive breast reconstructive option. \par \par Interval hx (7/12/19): Pt presents for bandage change- POD #3 s/p bilateral breast implant exchange, AlloDerm placement, and capsulectomy. C/o right breast drainage saturating bandage. Denies pain, f/c, or purulence. \par \par Interval hx (7/15/19). Patient presents today POD#6 s/p bilateral breast implant exchange, AlloDerm placement, and capsulectomy. Doing well and very pleased with her result reporting improvement in pain, discomfort and overall aesthetics of her breasts. Compliant with oral antibiotics and surgical bra. Denies any fever, chills, drainage or bleeding. \par \par interval hx (7/22/19): Here for POD#13 s/p bilateral breast implant exchange.  Doing well.  Very happy with results.  Denies fevers, chills, redness.  completed course of PO abx.  compliant with bandeau and surgical bra use.\par \par Interval hx (8/2/19):  Here for POD#24 s/p bilateral breast implant exchange.  Went for right breast sono with no evidence of seroma.  Denies fevers, chills, redness.  c/o of right breast superior pole breast asymmetry and difference in volume.  Also c/o left breast soft tissue banding returning.\par \par Interval hx (8/29/19): Pt presents for 1st postop visit- POD #7 s/p revision of right breast reconstruction, capsulectomy and implant exchange. Reports she feels this is an improvement. Denies pain or f/c.\par \par Interval hx (9/5/19): 2nd  post-op visit POD#14 s/p revision of right breast reconstruction, partial capuslectomy and implant exchange. pt continue to feel improvement of right breast; no longer has right breast tightness.  Happy with result\par \par Interval hx (10/11/19): 7 weeks s/p right breast reconstruction, partial capsulectomy and implant exchange.  Compliant with bandeau use.  Also taken Singulair as instructed.  Feels improvement of capsular contracture in both breasts (R>L).  She reports the right breast implant has improved position as well.  She is happy with her results.  \par \par Interval hx (6/15/20):  10 month s/p right breast reconstruction revision and implant exchange. Patient completed the singulair 4th trial.  She reports that she had left breast discomfort with associated chest wall tightness.  She then started course of stretching therapy with improvement of implant pressure.  Patient happy with breast reconstruction revision.  Able to wear swimsuit\par \par Interval hx (10/16/20):  Here here 14 month s/p right breast reconstruction and implant exchange.\par \par Interval hx (9/20/21):  here for 25 months s/p right breast recon, capsulectomy and implant exchange. right breast implant is softer than left breast.   performing breast exams, no new breast lumps.

## 2021-09-20 NOTE — ASSESSMENT
[FreeTextEntry1] : 74 y/o F with hx of BL Breast CA s/p Chemotherapy and left breast XRT, s/p TE and silicone implant reconstruction (submuscular) with bilateral symptomatic capsular contracture.\par s/p bilateral breast implant exchange, AlloDerm placement, and capsulectomy 7/9/19; was unhappy with breast asymmetry\par \par 25 months s/p revision of right breast reconstruction, capsulectomy and implant exchange, doing well\par Recurrent XRT-related capsular contracture, grade 3 stable\par Pt comfortable with excellent BUE ROM\par \par -c/w implant massage and BUE ROM exercises\par -c/w all physical activity\par -Reviewed implant abx ppx\par -All questions were answered\par -No new PRS issues\par -Follow up in 1 year for implant check\par \par Due to COVID-19, pre-visit patient instructions were explained to the patient and their symptoms were checked upon arrival. Masks were used by the healthcare provider and staff and the examination room was cleaned after the patient visit concluded\par

## 2021-09-20 NOTE — PHYSICAL EXAM
[de-identified] : well-developed female, NAD [de-identified] : bilateral breast implants soft and in good position, incisions well healed, no evidence of seroma\par right breast with improved superior fullness, implant in better position, soft, nontender capsular contracture grade 2, no erythema or palpable fluid collection\par left breast with grade 3 capsular contracture and pec/axillary fold banding, nontender; no supple than last visit; mastectomy flap telangiectasia (inferior_\par right breast  volume > left breast

## 2021-10-08 ENCOUNTER — APPOINTMENT (OUTPATIENT)
Dept: PLASTIC SURGERY | Facility: CLINIC | Age: 76
End: 2021-10-08
Payer: MEDICARE

## 2021-10-08 ENCOUNTER — RESULT REVIEW (OUTPATIENT)
Age: 76
End: 2021-10-08

## 2021-10-08 PROCEDURE — 99212 OFFICE O/P EST SF 10 MIN: CPT

## 2021-10-08 NOTE — PHYSICAL EXAM
[de-identified] : well-developed female, NAD [de-identified] : bilateral breast implants soft and in good position, incisions well healed, no evidence of seroma\par right breast with improved superior fullness, implant in better position, soft, nontender capsular contracture grade 2, no erythema or palpable fluid collection; 10:00 at lateral border of pec with 1 cm firm tissue\par left breast with grade 3 capsular contracture and pec/axillary fold banding, nontender; no supple than last visit; mastectomy flap telangiectasia (inferior_\par right breast  volume > left breast

## 2021-10-08 NOTE — ASSESSMENT
[FreeTextEntry1] : 74 y/o F with hx of BL Breast CA s/p Chemotherapy and left breast XRT, s/p TE and silicone implant reconstruction (submuscular) with bilateral symptomatic capsular contracture.\par s/p bilateral breast implant exchange, AlloDerm placement, and capsulectomy 7/9/19; was unhappy with breast asymmetry\par \par 25 months s/p revision of right breast reconstruction, capsulectomy and implant exchange, doing well\par Recurrent XRT-related capsular contracture, grade 3 stable\par Pt comfortable with excellent BUE ROM\par \par Left 10:00 lateral border of pec with firm tissue, likely scar tissue from submusclar breast reconstruction, r/o \par recurrence\par \par -recommend left breast US\par -c/w implant massage and BUE ROM exercises\par -c/w all physical activity\par -Reviewed implant abx ppx\par -All questions were answered\par -Follow up after US; pt to get US in [place].  recommend forwarding US report to our office\par \par Due to COVID-19, pre-visit patient instructions were explained to the patient and their symptoms were checked upon arrival. Masks were used by the healthcare provider and staff and the examination room was cleaned after the patient visit concluded\par

## 2021-10-08 NOTE — HISTORY OF PRESENT ILLNESS
[FreeTextEntry1] : 74 yo with history of bilateral breast cancer (dx 2015-left stage T2N1 invasive carcinoma, right- T1CN) invasive carcinoma, ER/AK positive) s/p left MRM and right simple mastectomy and SLN followed by subpectoral TE placement, ?mesh, and silicone implant exchange with Dr. Brown at [facility]. Pt also underwent CTX completed Dec/16 and XRT completed in 2016. Denies wound healing issues. Pt now c/o dissatisfaction with cosmetic result as well as left breast discomfort. Sought second opinion with Dr. Brown in [place]. \par \par Pt was also recently diagnosed with hypercalcemia on lab work, pending endocrinology consultation for further workup. Nonsmoker\par \par Interval hx (6/7/19): Here for follow up to confirm her breast reconstruction revision plan. Pt just returned from [place] were she was for the past 6 months. Her  is present for the encounter. She reports that her hypercalcemia is now stable and has be taken off of calcium therapy (Dr. Lama). She was evaluated by Dr. Renae who reports no evidence of breast disease. Patient is here to confirm her breast revision surgical plan. Pt is still interested in less invasive breast reconstructive option. \par \par Interval hx (7/12/19): Pt presents for bandage change- POD #3 s/p bilateral breast implant exchange, AlloDerm placement, and capsulectomy. C/o right breast drainage saturating bandage. Denies pain, f/c, or purulence. \par \par Interval hx (7/15/19). Patient presents today POD#6 s/p bilateral breast implant exchange, AlloDerm placement, and capsulectomy. Doing well and very pleased with her result reporting improvement in pain, discomfort and overall aesthetics of her breasts. Compliant with oral antibiotics and surgical bra. Denies any fever, chills, drainage or bleeding. \par \par interval hx (7/22/19): Here for POD#13 s/p bilateral breast implant exchange.  Doing well.  Very happy with results.  Denies fevers, chills, redness.  completed course of PO abx.  compliant with bandeau and surgical bra use.\par \par Interval hx (8/2/19):  Here for POD#24 s/p bilateral breast implant exchange.  Went for right breast sono with no evidence of seroma.  Denies fevers, chills, redness.  c/o of right breast superior pole breast asymmetry and difference in volume.  Also c/o left breast soft tissue banding returning.\par \par Interval hx (8/29/19): Pt presents for 1st postop visit- POD #7 s/p revision of right breast reconstruction, capsulectomy and implant exchange. Reports she feels this is an improvement. Denies pain or f/c.\par \par Interval hx (9/5/19): 2nd  post-op visit POD#14 s/p revision of right breast reconstruction, partial capuslectomy and implant exchange. pt continue to feel improvement of right breast; no longer has right breast tightness.  Happy with result\par \par Interval hx (10/11/19): 7 weeks s/p right breast reconstruction, partial capsulectomy and implant exchange.  Compliant with bandeau use.  Also taken Singulair as instructed.  Feels improvement of capsular contracture in both breasts (R>L).  She reports the right breast implant has improved position as well.  She is happy with her results.  \par \par Interval hx (6/15/20):  10 month s/p right breast reconstruction revision and implant exchange. Patient completed the singulair 4th trial.  She reports that she had left breast discomfort with associated chest wall tightness.  She then started course of stretching therapy with improvement of implant pressure.  Patient happy with breast reconstruction revision.  Able to wear swimsuit\par \par Interval hx (10/16/20):  Here here 14 month s/p right breast reconstruction and implant exchange.\par \par Interval hx (9/20/21):  here for 25 months s/p right breast recon, capsulectomy and implant exchange. right breast implant is softer than left breast.   performing breast exams, no new breast lumps.\par \par Interval hx (10/8/21): Pt here with  for his office visit.  able to locate area of lump in left breast.

## 2021-10-22 ENCOUNTER — OUTPATIENT (OUTPATIENT)
Dept: OUTPATIENT SERVICES | Facility: HOSPITAL | Age: 76
LOS: 1 days | Discharge: HOME | End: 2021-10-22
Payer: MEDICARE

## 2021-10-22 ENCOUNTER — RESULT REVIEW (OUTPATIENT)
Age: 76
End: 2021-10-22

## 2021-10-22 DIAGNOSIS — Z90.13 ACQUIRED ABSENCE OF BILATERAL BREASTS AND NIPPLES: Chronic | ICD-10-CM

## 2021-10-22 DIAGNOSIS — Z98.82 BREAST IMPLANT STATUS: Chronic | ICD-10-CM

## 2021-10-22 DIAGNOSIS — Z98.49 CATARACT EXTRACTION STATUS, UNSPECIFIED EYE: Chronic | ICD-10-CM

## 2021-10-22 DIAGNOSIS — Z90.710 ACQUIRED ABSENCE OF BOTH CERVIX AND UTERUS: Chronic | ICD-10-CM

## 2021-10-22 DIAGNOSIS — R92.8 OTHER ABNORMAL AND INCONCLUSIVE FINDINGS ON DIAGNOSTIC IMAGING OF BREAST: ICD-10-CM

## 2021-10-22 DIAGNOSIS — Z90.49 ACQUIRED ABSENCE OF OTHER SPECIFIED PARTS OF DIGESTIVE TRACT: Chronic | ICD-10-CM

## 2021-10-22 PROCEDURE — 76642 ULTRASOUND BREAST LIMITED: CPT | Mod: 26,LT

## 2021-10-25 DIAGNOSIS — M81.0 AGE-RELATED OSTEOPOROSIS WITHOUT CURRENT PATHOLOGICAL FRACTURE: ICD-10-CM

## 2021-10-25 DIAGNOSIS — E21.0 PRIMARY HYPERPARATHYROIDISM: ICD-10-CM

## 2021-10-25 DIAGNOSIS — Z09 ENCOUNTER FOR FOLLOW-UP EXAMINATION AFTER COMPLETED TREATMENT FOR CONDITIONS OTHER THAN MALIGNANT NEOPLASM: ICD-10-CM

## 2021-10-25 DIAGNOSIS — Z78.0 ASYMPTOMATIC MENOPAUSAL STATE: ICD-10-CM

## 2021-11-01 ENCOUNTER — NON-APPOINTMENT (OUTPATIENT)
Age: 76
End: 2021-11-01

## 2022-05-27 ENCOUNTER — NON-APPOINTMENT (OUTPATIENT)
Age: 77
End: 2022-05-27

## 2022-07-07 ENCOUNTER — APPOINTMENT (OUTPATIENT)
Dept: SURGERY | Facility: CLINIC | Age: 77
End: 2022-07-07

## 2022-07-07 VITALS
HEART RATE: 92 BPM | TEMPERATURE: 96.5 F | HEIGHT: 62 IN | SYSTOLIC BLOOD PRESSURE: 160 MMHG | DIASTOLIC BLOOD PRESSURE: 90 MMHG | OXYGEN SATURATION: 97 % | WEIGHT: 161 LBS | BODY MASS INDEX: 29.63 KG/M2

## 2022-07-07 PROCEDURE — 99213 OFFICE O/P EST LOW 20 MIN: CPT

## 2022-07-07 NOTE — ASSESSMENT
[FreeTextEntry1] : Benign breast surveillance examination, with no evidence of local recurrence. She may benefit from a change in her endocrine therapy in terms of her musculoskeletal complaints. I believe her complaints in the breast regionis part of this problem, and not worrisome.  She will discuss this with the oncologist. Otherwise she will return here in one year for reexam or sooner as needed. All her questions were answered.

## 2022-07-07 NOTE — HISTORY OF PRESENT ILLNESS
[de-identified] : The patient returns here after an absence of almost 2 years. She had a previous bilateral mastectomy, with axillary node dissection on the left (T2 N1 and with a sentinel node biopsy on the right, T1cN0 in July of 2015. She had immediate reconstructions with postoperative chemotherapy and radiation therapy to the left mastectomy site. She had revision of her reconstructions about 2 years ago by Dr. Nathan and still complains of occasional breast pain in the region of the reconstructions, along with some diffuse body aches and pains. She remains on endocrine therapy and is considering changing his medication and possibly consulting different medical oncologists.

## 2022-07-07 NOTE — PHYSICAL EXAM
[de-identified] : healthy for stated age [de-identified] : no adenopathy [de-identified] : The bilateral breast reconstruction sites are free of any evidence of local recurrence. There are no palpable masses, suspicious areas, or suspicious skin changes and the implants appear to be soft and freely mobile. No axillary adenopathy bilaterally.

## 2022-07-21 ENCOUNTER — NON-APPOINTMENT (OUTPATIENT)
Age: 77
End: 2022-07-21

## 2022-07-25 ENCOUNTER — LABORATORY RESULT (OUTPATIENT)
Age: 77
End: 2022-07-25

## 2022-07-26 ENCOUNTER — APPOINTMENT (OUTPATIENT)
Dept: CARDIOLOGY | Facility: CLINIC | Age: 77
End: 2022-07-26
Payer: MEDICARE

## 2022-07-26 ENCOUNTER — APPOINTMENT (OUTPATIENT)
Dept: CARDIOLOGY | Facility: CLINIC | Age: 77
End: 2022-07-26

## 2022-07-26 VITALS
DIASTOLIC BLOOD PRESSURE: 80 MMHG | HEIGHT: 62 IN | SYSTOLIC BLOOD PRESSURE: 142 MMHG | BODY MASS INDEX: 29.26 KG/M2 | WEIGHT: 159 LBS

## 2022-07-26 PROCEDURE — 93242 EXT ECG>48HR<7D RECORDING: CPT

## 2022-07-26 PROCEDURE — 99204 OFFICE O/P NEW MOD 45 MIN: CPT

## 2022-07-26 PROCEDURE — 93000 ELECTROCARDIOGRAM COMPLETE: CPT | Mod: 59

## 2022-07-26 RX ORDER — DENOSUMAB 60 MG/ML
60 INJECTION SUBCUTANEOUS
Qty: 1 | Refills: 0 | Status: DISCONTINUED | COMMUNITY
Start: 2019-07-31 | End: 2022-07-26

## 2022-07-26 RX ORDER — TRAMADOL HYDROCHLORIDE 50 MG/1
50 TABLET, COATED ORAL
Qty: 10 | Refills: 0 | Status: DISCONTINUED | COMMUNITY
Start: 2019-07-09 | End: 2022-07-26

## 2022-07-26 RX ORDER — DILTIAZEM HYDROCHLORIDE 120 MG/1
120 TABLET, COATED ORAL
Refills: 0 | Status: DISCONTINUED | COMMUNITY
End: 2022-07-26

## 2022-07-26 RX ORDER — EZETIMIBE 10 MG/1
10 TABLET ORAL
Refills: 0 | Status: DISCONTINUED | COMMUNITY
End: 2022-07-26

## 2022-07-26 RX ORDER — DENOSUMAB 60 MG/ML
60 INJECTION SUBCUTANEOUS
Refills: 0 | Status: DISCONTINUED | COMMUNITY
End: 2022-07-26

## 2022-07-26 RX ORDER — DOXYCYCLINE HYCLATE 100 MG/1
100 TABLET ORAL
Qty: 3 | Refills: 2 | Status: DISCONTINUED | COMMUNITY
Start: 2020-10-16 | End: 2022-07-26

## 2022-07-26 RX ORDER — PROCHLORPERAZINE MALEATE 10 MG/1
10 TABLET ORAL
Refills: 0 | Status: DISCONTINUED | COMMUNITY
End: 2022-07-26

## 2022-07-26 RX ORDER — ASPIRIN 81 MG/1
81 TABLET, DELAYED RELEASE ORAL
Refills: 0 | Status: DISCONTINUED | COMMUNITY
End: 2022-07-26

## 2022-07-26 RX ORDER — LOSARTAN POTASSIUM 50 MG/1
50 TABLET, FILM COATED ORAL
Qty: 90 | Refills: 0 | Status: DISCONTINUED | COMMUNITY
Start: 2019-03-05 | End: 2022-07-26

## 2022-07-26 NOTE — PHYSICAL EXAM
[Well Developed] : well developed [Well Nourished] : well nourished [Normal Conjunctiva] : normal conjunctiva [Normal Venous Pressure] : normal venous pressure [Normal Rate] : normal [Rhythm Regular] : regular [Normal S1] : normal S1 [Normal S2] : normal S2 [S3] : no S3 [S4] : no S4 [I] : a grade 1 [Clear Lung Fields] : clear lung fields [Soft] : abdomen soft [Normal Gait] : normal gait [No Edema] : no edema [Moves all extremities] : moves all extremities [Alert and Oriented] : alert and oriented

## 2022-07-26 NOTE — REVIEW OF SYSTEMS
[Fever] : no fever [Blurry Vision] : no blurred vision [Hearing Loss] : no hearing loss [SOB] : no shortness of breath [Dyspnea on exertion] : dyspnea during exertion [Cough] : no cough [Abdominal Pain] : no abdominal pain [Dysuria] : no dysuria [Joint Pain] : joint pain [Knee Pain] : knee pain [Rash] : no rash [Dizziness] : no dizziness [Confusion] : no confusion was observed [Easy Bleeding] : no tendency for easy bleeding

## 2022-07-26 NOTE — HISTORY OF PRESENT ILLNESS
[FreeTextEntry1] : 78 yo with hx HTN HLD FH cad. She has breast cancer HLD. She had covid 5/22. She recently developed Figueroa. She gets l sided chest pain. She gets palpitations with exertion
maykel

## 2022-07-26 NOTE — DISCUSSION/SUMMARY
[FreeTextEntry1] : 78 yo with hx HTN HLD FH cad. She has breast cancer HLD. She had covid 5/22. She recently developed Figueroa. She gets l sided chest pain. She gets palpitations with exertion. Leg pain walking since she took prolia. She needs blood. She needs a echo. She needs a holter

## 2022-07-27 LAB
ALBUMIN SERPL ELPH-MCNC: 4.5 G/DL
ALP BLD-CCNC: 104 U/L
ALT SERPL-CCNC: 16 U/L
ANION GAP SERPL CALC-SCNC: 11 MMOL/L
AST SERPL-CCNC: 19 U/L
BASOPHILS # BLD AUTO: 0.07 K/UL
BASOPHILS NFR BLD AUTO: 0.7 %
BILIRUB SERPL-MCNC: 0.4 MG/DL
BUN SERPL-MCNC: 15 MG/DL
CALCIUM SERPL-MCNC: 10.2 MG/DL
CHLORIDE SERPL-SCNC: 97 MMOL/L
CO2 SERPL-SCNC: 28 MMOL/L
CREAT SERPL-MCNC: 0.8 MG/DL
DEPRECATED D DIMER PPP IA-ACNC: 306 NG/ML DDU
EGFR: 76 ML/MIN/1.73M2
EOSINOPHIL # BLD AUTO: 0.15 K/UL
EOSINOPHIL NFR BLD AUTO: 1.6 %
GLUCOSE SERPL-MCNC: 136 MG/DL
HCT VFR BLD CALC: 43.7 %
HGB BLD-MCNC: 14 G/DL
IMM GRANULOCYTES NFR BLD AUTO: 0.4 %
LYMPHOCYTES # BLD AUTO: 1.7 K/UL
LYMPHOCYTES NFR BLD AUTO: 17.8 %
MAN DIFF?: NORMAL
MCHC RBC-ENTMCNC: 30.3 PG
MCHC RBC-ENTMCNC: 32 G/DL
MCV RBC AUTO: 94.6 FL
MONOCYTES # BLD AUTO: 0.8 K/UL
MONOCYTES NFR BLD AUTO: 8.4 %
NEUTROPHILS # BLD AUTO: 6.78 K/UL
NEUTROPHILS NFR BLD AUTO: 71.1 %
PLATELET # BLD AUTO: 246 K/UL
POTASSIUM SERPL-SCNC: 2.9 MMOL/L
PROT SERPL-MCNC: 7.1 G/DL
RBC # BLD: 4.62 M/UL
RBC # FLD: 13.3 %
SODIUM SERPL-SCNC: 136 MMOL/L
T4 SERPL-MCNC: 7.8 UG/DL
TSH SERPL-ACNC: 1.89 UIU/ML
WBC # FLD AUTO: 9.54 K/UL

## 2022-07-27 RX ORDER — POTASSIUM CHLORIDE 1.5 G/1.58G
20 POWDER, FOR SOLUTION ORAL DAILY
Qty: 30 | Refills: 3 | Status: DISCONTINUED | COMMUNITY
Start: 2022-07-27 | End: 2022-07-27

## 2022-07-29 ENCOUNTER — OUTPATIENT (OUTPATIENT)
Dept: OUTPATIENT SERVICES | Facility: HOSPITAL | Age: 77
LOS: 1 days | Discharge: HOME | End: 2022-07-29

## 2022-07-29 ENCOUNTER — RESULT REVIEW (OUTPATIENT)
Age: 77
End: 2022-07-29

## 2022-07-29 DIAGNOSIS — R06.02 SHORTNESS OF BREATH: ICD-10-CM

## 2022-07-29 DIAGNOSIS — Z90.49 ACQUIRED ABSENCE OF OTHER SPECIFIED PARTS OF DIGESTIVE TRACT: Chronic | ICD-10-CM

## 2022-07-29 DIAGNOSIS — Z90.13 ACQUIRED ABSENCE OF BILATERAL BREASTS AND NIPPLES: Chronic | ICD-10-CM

## 2022-07-29 DIAGNOSIS — Z90.710 ACQUIRED ABSENCE OF BOTH CERVIX AND UTERUS: Chronic | ICD-10-CM

## 2022-07-29 DIAGNOSIS — Z98.82 BREAST IMPLANT STATUS: Chronic | ICD-10-CM

## 2022-07-29 DIAGNOSIS — Z98.49 CATARACT EXTRACTION STATUS, UNSPECIFIED EYE: Chronic | ICD-10-CM

## 2022-07-29 PROCEDURE — 71260 CT THORAX DX C+: CPT | Mod: 26,MH

## 2022-08-02 ENCOUNTER — APPOINTMENT (OUTPATIENT)
Dept: CARDIOLOGY | Facility: CLINIC | Age: 77
End: 2022-08-02

## 2022-08-02 ENCOUNTER — NON-APPOINTMENT (OUTPATIENT)
Age: 77
End: 2022-08-02

## 2022-08-02 PROCEDURE — 93244 EXT ECG>48HR<7D REV&INTERPJ: CPT

## 2022-08-04 LAB
ANION GAP SERPL CALC-SCNC: 14 MMOL/L
BUN SERPL-MCNC: 13 MG/DL
CALCIUM SERPL-MCNC: 9.7 MG/DL
CHLORIDE SERPL-SCNC: 103 MMOL/L
CO2 SERPL-SCNC: 27 MMOL/L
CREAT SERPL-MCNC: 0.7 MG/DL
EGFR: 89 ML/MIN/1.73M2
GLUCOSE SERPL-MCNC: 91 MG/DL
POTASSIUM SERPL-SCNC: 3.5 MMOL/L
SODIUM SERPL-SCNC: 144 MMOL/L

## 2022-08-16 ENCOUNTER — APPOINTMENT (OUTPATIENT)
Dept: CARDIOLOGY | Facility: CLINIC | Age: 77
End: 2022-08-16

## 2022-08-17 ENCOUNTER — LABORATORY RESULT (OUTPATIENT)
Age: 77
End: 2022-08-17

## 2022-08-17 ENCOUNTER — APPOINTMENT (OUTPATIENT)
Dept: HEMATOLOGY ONCOLOGY | Facility: CLINIC | Age: 77
End: 2022-08-17

## 2022-08-17 VITALS
HEART RATE: 69 BPM | SYSTOLIC BLOOD PRESSURE: 192 MMHG | HEIGHT: 62 IN | DIASTOLIC BLOOD PRESSURE: 88 MMHG | TEMPERATURE: 97.6 F | WEIGHT: 159 LBS | BODY MASS INDEX: 29.26 KG/M2

## 2022-08-17 PROCEDURE — 99205 OFFICE O/P NEW HI 60 MIN: CPT

## 2022-08-18 ENCOUNTER — OUTPATIENT (OUTPATIENT)
Dept: OUTPATIENT SERVICES | Facility: HOSPITAL | Age: 77
LOS: 1 days | Discharge: HOME | End: 2022-08-18

## 2022-08-18 DIAGNOSIS — I25.10 ATHEROSCLEROTIC HEART DISEASE OF NATIVE CORONARY ARTERY WITHOUT ANGINA PECTORIS: ICD-10-CM

## 2022-08-18 DIAGNOSIS — Z98.82 BREAST IMPLANT STATUS: Chronic | ICD-10-CM

## 2022-08-18 DIAGNOSIS — Z90.13 ACQUIRED ABSENCE OF BILATERAL BREASTS AND NIPPLES: Chronic | ICD-10-CM

## 2022-08-18 DIAGNOSIS — Z90.710 ACQUIRED ABSENCE OF BOTH CERVIX AND UTERUS: Chronic | ICD-10-CM

## 2022-08-18 DIAGNOSIS — Z98.49 CATARACT EXTRACTION STATUS, UNSPECIFIED EYE: Chronic | ICD-10-CM

## 2022-08-18 DIAGNOSIS — R00.2 PALPITATIONS: ICD-10-CM

## 2022-08-18 DIAGNOSIS — Z90.49 ACQUIRED ABSENCE OF OTHER SPECIFIED PARTS OF DIGESTIVE TRACT: Chronic | ICD-10-CM

## 2022-08-18 LAB — 25(OH)D3 SERPL-MCNC: 61 NG/ML

## 2022-08-18 PROCEDURE — 78452 HT MUSCLE IMAGE SPECT MULT: CPT | Mod: 26,MH

## 2022-08-22 LAB
ALBUMIN SERPL ELPH-MCNC: 4.9 G/DL
ALP BLD-CCNC: 95 U/L
ALT SERPL-CCNC: 21 U/L
ANION GAP SERPL CALC-SCNC: 13 MMOL/L
AST SERPL-CCNC: 22 U/L
BILIRUB SERPL-MCNC: 0.7 MG/DL
BUN SERPL-MCNC: 15 MG/DL
CALCIUM SERPL-MCNC: 10.6 MG/DL
CANCER AG15-3 SERPL-ACNC: 7.9 U/ML
CEA SERPL-MCNC: 1.5 NG/ML
CHLORIDE SERPL-SCNC: 101 MMOL/L
CO2 SERPL-SCNC: 29 MMOL/L
CREAT SERPL-MCNC: 0.9 MG/DL
EGFR: 66 ML/MIN/1.73M2
GLUCOSE SERPL-MCNC: 92 MG/DL
HCT VFR BLD CALC: 43.7 %
HGB BLD-MCNC: 14.8 G/DL
MCHC RBC-ENTMCNC: 30.8 PG
MCHC RBC-ENTMCNC: 33.9 G/DL
MCV RBC AUTO: 91 FL
PLATELET # BLD AUTO: 272 K/UL
PMV BLD: 10.3 FL
POTASSIUM SERPL-SCNC: 3.3 MMOL/L
PROT SERPL-MCNC: 8.3 G/DL
RBC # BLD: 4.8 M/UL
RBC # FLD: 13.1 %
SODIUM SERPL-SCNC: 143 MMOL/L
T(9;22)(ABL1,BCR)/CONTROL BLD/T: NORMAL
WBC # FLD AUTO: 11.97 K/UL

## 2022-08-22 NOTE — ASSESSMENT
[FreeTextEntry1] : In summary this is a 77 year old woman with H/O b/l breast cancer\par \par Left side: [id number]\par Rt side [id number]\par HR +, Her2 Neg\par \par Pt recd Adjuvant chemo with AC and Taxol and has been on Arimidex since 12/15.\par \par RECOMMENDATIONS\par I reviewed all available records. So far pt has remained without e/o recurrence.She has completed over 5 years of endocrine therapy. She is worried about recurrent disease should she decide to stop AI.\par I will obtain her records from [facility].\par Will send BCI.\par Pt should bring results of DEXA with her at her next appt.\par Based on the risks and benefits decision regarding continuing vs stopping AI will be made\par \par Pt also has leucocytosis, w/u ordered\par \par Thyroid US ordered \par \par LAbs ordered \par \par RTC in 1 mth

## 2022-08-22 NOTE — HISTORY OF PRESENT ILLNESS
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

## 2022-08-22 NOTE — PHYSICAL EXAM
[Fully active, able to carry on all pre-disease performance without restriction] : Status 0 - Fully active, able to carry on all pre-disease performance without restriction [Normal] : affect appropriate [de-identified] : b/l mastectomy with reconstruction

## 2022-08-25 ENCOUNTER — OUTPATIENT (OUTPATIENT)
Dept: OUTPATIENT SERVICES | Facility: HOSPITAL | Age: 77
LOS: 1 days | Discharge: HOME | End: 2022-08-25

## 2022-08-25 DIAGNOSIS — Z98.82 BREAST IMPLANT STATUS: Chronic | ICD-10-CM

## 2022-08-25 DIAGNOSIS — Z98.49 CATARACT EXTRACTION STATUS, UNSPECIFIED EYE: Chronic | ICD-10-CM

## 2022-08-25 DIAGNOSIS — Z90.13 ACQUIRED ABSENCE OF BILATERAL BREASTS AND NIPPLES: Chronic | ICD-10-CM

## 2022-08-25 DIAGNOSIS — Z90.710 ACQUIRED ABSENCE OF BOTH CERVIX AND UTERUS: Chronic | ICD-10-CM

## 2022-08-25 DIAGNOSIS — Z90.49 ACQUIRED ABSENCE OF OTHER SPECIFIED PARTS OF DIGESTIVE TRACT: Chronic | ICD-10-CM

## 2022-08-25 DIAGNOSIS — E04.1 NONTOXIC SINGLE THYROID NODULE: ICD-10-CM

## 2022-08-25 PROCEDURE — 76536 US EXAM OF HEAD AND NECK: CPT | Mod: 26

## 2022-08-26 ENCOUNTER — NON-APPOINTMENT (OUTPATIENT)
Age: 77
End: 2022-08-26

## 2022-09-03 ENCOUNTER — LABORATORY RESULT (OUTPATIENT)
Age: 77
End: 2022-09-03

## 2022-09-06 ENCOUNTER — RX RENEWAL (OUTPATIENT)
Age: 77
End: 2022-09-06

## 2022-09-06 RX ORDER — POTASSIUM CHLORIDE 1500 MG/1
20 TABLET, FILM COATED, EXTENDED RELEASE ORAL DAILY
Qty: 30 | Refills: 3 | Status: DISCONTINUED | COMMUNITY
Start: 2022-07-27 | End: 2022-09-06

## 2022-09-15 ENCOUNTER — APPOINTMENT (OUTPATIENT)
Dept: HEMATOLOGY ONCOLOGY | Facility: CLINIC | Age: 77
End: 2022-09-15

## 2022-09-15 ENCOUNTER — OUTPATIENT (OUTPATIENT)
Dept: OUTPATIENT SERVICES | Facility: HOSPITAL | Age: 77
LOS: 1 days | Discharge: HOME | End: 2022-09-15

## 2022-09-15 VITALS
BODY MASS INDEX: 29.44 KG/M2 | SYSTOLIC BLOOD PRESSURE: 196 MMHG | DIASTOLIC BLOOD PRESSURE: 91 MMHG | WEIGHT: 160 LBS | HEIGHT: 62 IN | RESPIRATION RATE: 18 BRPM | TEMPERATURE: 98.4 F | HEART RATE: 87 BPM

## 2022-09-15 DIAGNOSIS — E04.1 NONTOXIC SINGLE THYROID NODULE: ICD-10-CM

## 2022-09-15 DIAGNOSIS — Z90.13 ACQUIRED ABSENCE OF BILATERAL BREASTS AND NIPPLES: Chronic | ICD-10-CM

## 2022-09-15 DIAGNOSIS — Z98.49 CATARACT EXTRACTION STATUS, UNSPECIFIED EYE: Chronic | ICD-10-CM

## 2022-09-15 DIAGNOSIS — Z79.811 LONG TERM (CURRENT) USE OF AROMATASE INHIBITORS: ICD-10-CM

## 2022-09-15 DIAGNOSIS — Z90.710 ACQUIRED ABSENCE OF BOTH CERVIX AND UTERUS: Chronic | ICD-10-CM

## 2022-09-15 DIAGNOSIS — D72.829 ELEVATED WHITE BLOOD CELL COUNT, UNSPECIFIED: ICD-10-CM

## 2022-09-15 DIAGNOSIS — Z90.49 ACQUIRED ABSENCE OF OTHER SPECIFIED PARTS OF DIGESTIVE TRACT: Chronic | ICD-10-CM

## 2022-09-15 DIAGNOSIS — Z98.82 BREAST IMPLANT STATUS: Chronic | ICD-10-CM

## 2022-09-15 DIAGNOSIS — C50.919 MALIGNANT NEOPLASM OF UNSPECIFIED SITE OF UNSPECIFIED FEMALE BREAST: ICD-10-CM

## 2022-09-15 PROCEDURE — 99214 OFFICE O/P EST MOD 30 MIN: CPT

## 2022-09-21 ENCOUNTER — APPOINTMENT (OUTPATIENT)
Dept: CARDIOLOGY | Facility: CLINIC | Age: 77
End: 2022-09-21

## 2022-09-21 PROCEDURE — 93306 TTE W/DOPPLER COMPLETE: CPT

## 2022-09-23 PROBLEM — E04.1 THYROID NODULE: Status: ACTIVE | Noted: 2022-08-17

## 2022-09-23 PROBLEM — D72.829 LEUCOCYTOSIS: Status: ACTIVE | Noted: 2022-08-22

## 2022-09-23 NOTE — ASSESSMENT
[FreeTextEntry1] : In summary this is a 77 year old woman with H/O b/l breast cancer\par \par Left side: [id number]\par Rt side [id number]\par HR +, Her2 Neg\par \par Pt recd Adjuvant chemo with AC and Taxol and has been on Arimidex since 12/15.\par \par RECOMMENDATIONS\par I reviewed all available records. So far pt has remained without e/o recurrence.She has completed over 5 years of endocrine therapy. She is worried about recurrent disease should she decide to stop AI.\par \par We have ordered BCI.\par Pt should bring results of DEXA with her at her next appt.\par All labs and imaging d/w her. \par She will follow with endocrine for thyroid findings.\par Leucocytosis is stable no e/o myeloproliferative process, Continue to monitor\par Thyroid US rslts d/w pt\par LAbs ordered \par \par RTC in 6 mth

## 2022-09-23 NOTE — HISTORY OF PRESENT ILLNESS
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

## 2022-09-25 LAB
ANION GAP SERPL CALC-SCNC: 16 MMOL/L
BUN SERPL-MCNC: 12 MG/DL
CALCIUM SERPL-MCNC: 10.6 MG/DL
CHLORIDE SERPL-SCNC: 101 MMOL/L
CO2 SERPL-SCNC: 24 MMOL/L
CREAT SERPL-MCNC: 0.8 MG/DL
EGFR: 76 ML/MIN/1.73M2
GLUCOSE SERPL-MCNC: 82 MG/DL
POTASSIUM SERPL-SCNC: 3.7 MMOL/L
SODIUM SERPL-SCNC: 141 MMOL/L

## 2022-09-26 ENCOUNTER — APPOINTMENT (OUTPATIENT)
Dept: PLASTIC SURGERY | Facility: CLINIC | Age: 77
End: 2022-09-26

## 2022-09-26 PROCEDURE — 99214 OFFICE O/P EST MOD 30 MIN: CPT

## 2022-09-26 NOTE — PHYSICAL EXAM
[de-identified] : well-developed female, NAD [de-identified] : bilateral breast implants soft and in good position, incisions well healed, no evidence of seroma\par right breast with improved superior fullness, implant in better position, soft, nontender capsular contracture grade 2,\par left breast with grade 3 capsular contracture and pec/axillary fold banding, nontender; no supple than last visit; mastectomy flap telangiectasia (inferior)\par right breast  volume > left breast

## 2022-09-26 NOTE — HISTORY OF PRESENT ILLNESS
[FreeTextEntry1] : 72 yo with history of bilateral breast cancer (dx 2015-left stage T2N1 invasive carcinoma, right- T1CN) invasive carcinoma, ER/GA positive) s/p left MRM and right simple mastectomy and SLN followed by subpectoral TE placement, ?mesh, and silicone implant exchange with Dr. Brown at [facility]. Pt also underwent CTX completed Dec/16 and XRT completed in 2016. Denies wound healing issues. Pt now c/o dissatisfaction with cosmetic result as well as left breast discomfort. Sought second opinion with Dr. Brown in [place]. \par \par Pt was also recently diagnosed with hypercalcemia on lab work, pending endocrinology consultation for further workup. Nonsmoker\par \par Interval hx (6/7/19): Here for follow up to confirm her breast reconstruction revision plan. Pt just returned from [place] were she was for the past 6 months. Her  is present for the encounter. She reports that her hypercalcemia is now stable and has be taken off of calcium therapy (Dr. Lama). She was evaluated by Dr. Renae who reports no evidence of breast disease. Patient is here to confirm her breast revision surgical plan. Pt is still interested in less invasive breast reconstructive option. \par \par Interval hx (7/12/19): Pt presents for bandage change- POD #3 s/p bilateral breast implant exchange, AlloDerm placement, and capsulectomy. C/o right breast drainage saturating bandage. Denies pain, f/c, or purulence. \par \par Interval hx (7/15/19). Patient presents today POD#6 s/p bilateral breast implant exchange, AlloDerm placement, and capsulectomy. Doing well and very pleased with her result reporting improvement in pain, discomfort and overall aesthetics of her breasts. Compliant with oral antibiotics and surgical bra. Denies any fever, chills, drainage or bleeding. \par \par interval hx (7/22/19): Here for POD#13 s/p bilateral breast implant exchange.  Doing well.  Very happy with results.  Denies fevers, chills, redness.  completed course of PO abx.  compliant with bandeau and surgical bra use.\par \par Interval hx (8/2/19):  Here for POD#24 s/p bilateral breast implant exchange.  Went for right breast sono with no evidence of seroma.  Denies fevers, chills, redness.  c/o of right breast superior pole breast asymmetry and difference in volume.  Also c/o left breast soft tissue banding returning.\par \par Interval hx (8/29/19): Pt presents for 1st postop visit- POD #7 s/p revision of right breast reconstruction, capsulectomy and implant exchange. Reports she feels this is an improvement. Denies pain or f/c.\par \par Interval hx (9/5/19): 2nd  post-op visit POD#14 s/p revision of right breast reconstruction, partial capuslectomy and implant exchange. pt continue to feel improvement of right breast; no longer has right breast tightness.  Happy with result\par \par Interval hx (10/11/19): 7 weeks s/p right breast reconstruction, partial capsulectomy and implant exchange.  Compliant with bandeau use.  Also taken Singulair as instructed.  Feels improvement of capsular contracture in both breasts (R>L).  She reports the right breast implant has improved position as well.  She is happy with her results.  \par \par Interval hx (6/15/20):  10 month s/p right breast reconstruction revision and implant exchange. Patient completed the singulair 4th trial.  She reports that she had left breast discomfort with associated chest wall tightness.  She then started course of stretching therapy with improvement of implant pressure.  Patient happy with breast reconstruction revision.  Able to wear swimsuit\par \par Interval hx (10/16/20):  Here here 14 month s/p right breast reconstruction and implant exchange.\par \par Interval hx (9/20/21):  here for 25 months s/p right breast recon, capsulectomy and implant exchange. right breast implant is softer than left breast.   performing breast exams, no new breast lumps.\par \par Interval hx (10/8/21): Pt here with  for his office visit.  able to locate area of lump in left breast.\par \par Interval hx (9/26/22): 3 years s/p BL breast reconstruction revision and implant exchange. No breast reconstruction complaints.  She felt the Singulair helped the appearance of her breasts.

## 2022-09-27 DIAGNOSIS — C50.911 MALIGNANT NEOPLASM OF UNSPECIFIED SITE OF RIGHT FEMALE BREAST: ICD-10-CM

## 2022-10-04 ENCOUNTER — RX RENEWAL (OUTPATIENT)
Age: 77
End: 2022-10-04

## 2022-10-31 ENCOUNTER — APPOINTMENT (OUTPATIENT)
Dept: CARDIOLOGY | Facility: CLINIC | Age: 77
End: 2022-10-31

## 2022-10-31 VITALS
HEIGHT: 62 IN | DIASTOLIC BLOOD PRESSURE: 80 MMHG | WEIGHT: 160 LBS | SYSTOLIC BLOOD PRESSURE: 132 MMHG | BODY MASS INDEX: 29.44 KG/M2

## 2022-10-31 PROCEDURE — 99213 OFFICE O/P EST LOW 20 MIN: CPT

## 2022-10-31 NOTE — DISCUSSION/SUMMARY
[FreeTextEntry1] : 76 yo with hx HTN HLD FH cad. She has breast cancer HLD. She had covid 5/22. She has Figueroa. Chest pain resolved. . Palpitations resolved  Leg pain walking since she took prolia. K low now on kcl. Bloods reviewed.  Lexiscan 8/22 no ischemia. Holter 7/22 brief svt. \par She needs repeat ct scan chest ground glass.

## 2022-10-31 NOTE — HISTORY OF PRESENT ILLNESS
[FreeTextEntry1] : 76 yo with hx HTN HLD FH cad. She has breast cancer HLD. She had covid 5/22. She CHIRINOS . No further chest gain.  Palpitations

## 2022-12-21 ENCOUNTER — RESULT REVIEW (OUTPATIENT)
Age: 77
End: 2022-12-21

## 2022-12-21 ENCOUNTER — OUTPATIENT (OUTPATIENT)
Dept: OUTPATIENT SERVICES | Facility: HOSPITAL | Age: 77
LOS: 1 days | Discharge: HOME | End: 2022-12-21

## 2022-12-21 DIAGNOSIS — Z90.710 ACQUIRED ABSENCE OF BOTH CERVIX AND UTERUS: Chronic | ICD-10-CM

## 2022-12-21 DIAGNOSIS — Z98.82 BREAST IMPLANT STATUS: Chronic | ICD-10-CM

## 2022-12-21 DIAGNOSIS — Z98.49 CATARACT EXTRACTION STATUS, UNSPECIFIED EYE: Chronic | ICD-10-CM

## 2022-12-21 DIAGNOSIS — Z90.13 ACQUIRED ABSENCE OF BILATERAL BREASTS AND NIPPLES: Chronic | ICD-10-CM

## 2022-12-21 DIAGNOSIS — R06.02 SHORTNESS OF BREATH: ICD-10-CM

## 2022-12-21 DIAGNOSIS — Z90.49 ACQUIRED ABSENCE OF OTHER SPECIFIED PARTS OF DIGESTIVE TRACT: Chronic | ICD-10-CM

## 2022-12-21 PROCEDURE — 71250 CT THORAX DX C-: CPT | Mod: 26,MH

## 2022-12-22 ENCOUNTER — NON-APPOINTMENT (OUTPATIENT)
Age: 77
End: 2022-12-22

## 2022-12-29 ENCOUNTER — APPOINTMENT (OUTPATIENT)
Dept: HEMATOLOGY ONCOLOGY | Facility: CLINIC | Age: 77
End: 2022-12-29
Payer: MEDICARE

## 2022-12-29 ENCOUNTER — OUTPATIENT (OUTPATIENT)
Dept: OUTPATIENT SERVICES | Facility: HOSPITAL | Age: 77
LOS: 1 days | End: 2022-12-29

## 2022-12-29 VITALS
BODY MASS INDEX: 29.44 KG/M2 | OXYGEN SATURATION: 99 % | HEIGHT: 62 IN | WEIGHT: 160 LBS | RESPIRATION RATE: 20 BRPM | DIASTOLIC BLOOD PRESSURE: 95 MMHG | HEART RATE: 85 BPM | SYSTOLIC BLOOD PRESSURE: 184 MMHG

## 2022-12-29 DIAGNOSIS — Z98.82 BREAST IMPLANT STATUS: Chronic | ICD-10-CM

## 2022-12-29 DIAGNOSIS — Z90.710 ACQUIRED ABSENCE OF BOTH CERVIX AND UTERUS: Chronic | ICD-10-CM

## 2022-12-29 DIAGNOSIS — Z90.13 ACQUIRED ABSENCE OF BILATERAL BREASTS AND NIPPLES: Chronic | ICD-10-CM

## 2022-12-29 DIAGNOSIS — Z98.49 CATARACT EXTRACTION STATUS, UNSPECIFIED EYE: Chronic | ICD-10-CM

## 2022-12-29 DIAGNOSIS — Z90.49 ACQUIRED ABSENCE OF OTHER SPECIFIED PARTS OF DIGESTIVE TRACT: Chronic | ICD-10-CM

## 2022-12-29 PROCEDURE — 99214 OFFICE O/P EST MOD 30 MIN: CPT

## 2023-01-03 NOTE — PHYSICAL EXAM
[Fully active, able to carry on all pre-disease performance without restriction] : Status 0 - Fully active, able to carry on all pre-disease performance without restriction [Normal] : affect appropriate [de-identified] : s/p bilateral mastectomy with reconstruction; chest/breast exam unrevealing

## 2023-01-03 NOTE — ASSESSMENT
[FreeTextEntry1] : Patient is a 77 year old woman with H/O b/l breast cancer\par \par Left side: [id number]\par Rt side [id number]\par HR +, Her2 Neg\par \par Pt received Adjuvant chemo with AC and Taxol and has been on Arimidex since 12/15.\par \par BCI showed no benefit from extended endocrine therapy, has a 17% risk of late distant recurrence (years 5-10) for HR+, LN +\par \par RECOMMENDATIONS:\par Previous notes reviewed and all relevant results discussed with Dr Brantley and were communicated to the patient.\par SHE WAS MADE AWARE THAT BCI DID NOT SHOW BENEFIT FROM EXTENDED AI\par -- Since her risk of distant recurrence is high, recommend to continue Arimidex if she is able to tolerate. Risk and benefits discussed with the patient in detail and she agreed to continuing Arimidex 1 mg. Patient does not need refill at this time.\par The side effects from aromatase inhibitor were discussed in detail including but not limited to hot flashes, weight gain, hair thinning, arthralgia, myalgia, bone loss, increased risk of osteopenic fractures and thrombo-embolism.\par She will take Ca + Vit D daily while on AI\par Her compliance and any side effects from such treatment were assessed at today's visit.\par \par -- Advised to bring or fax her bone density results.\par -- Encourage regular exercise.\par -- Follow with endocrine for thyroid findings.\par -- Continue to follow up with PCP, cardio, gyne and GI as recommended.\par \par #Leucocytosis\par -- Stable no e/o myeloproliferative process\par -- Continue to monitor\par \par RTC in 6 months\par \par Case was seen and discussed with Dr. Brantley who agreed with assessment and plan.\par

## 2023-01-03 NOTE — HISTORY OF PRESENT ILLNESS
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 [de-identified] : 12/29/22\par Patient is here to follow up for breast cancer.\par She stopped taking Arimidex on 12/2/22 completing 7 years, since she is having a lot of joint stiffness but concerned of recurrence should she stop AI.\par She denies any new breast/chest concerns.\par She states that her cardio switched her statin to Repatha which she will start after discussing with us if it's ok to proceed with it.\par She does not have copy of her most recent bone density, she gets Prolia injections in [place] where she resides also.

## 2023-01-04 DIAGNOSIS — C50.911 MALIGNANT NEOPLASM OF UNSPECIFIED SITE OF RIGHT FEMALE BREAST: ICD-10-CM

## 2023-01-04 DIAGNOSIS — C50.919 MALIGNANT NEOPLASM OF UNSPECIFIED SITE OF UNSPECIFIED FEMALE BREAST: ICD-10-CM

## 2023-02-06 ENCOUNTER — RX RENEWAL (OUTPATIENT)
Age: 78
End: 2023-02-06

## 2023-05-16 ENCOUNTER — APPOINTMENT (OUTPATIENT)
Dept: HEMATOLOGY ONCOLOGY | Facility: CLINIC | Age: 78
End: 2023-05-16
Payer: MEDICARE

## 2023-05-16 ENCOUNTER — LABORATORY RESULT (OUTPATIENT)
Age: 78
End: 2023-05-16

## 2023-05-16 ENCOUNTER — OUTPATIENT (OUTPATIENT)
Dept: OUTPATIENT SERVICES | Facility: HOSPITAL | Age: 78
LOS: 1 days | Discharge: HOME | End: 2023-05-16
Payer: MEDICARE

## 2023-05-16 VITALS
DIASTOLIC BLOOD PRESSURE: 91 MMHG | SYSTOLIC BLOOD PRESSURE: 193 MMHG | TEMPERATURE: 97 F | HEART RATE: 78 BPM | HEIGHT: 62 IN | WEIGHT: 156 LBS | BODY MASS INDEX: 28.71 KG/M2

## 2023-05-16 VITALS — DIASTOLIC BLOOD PRESSURE: 91 MMHG | SYSTOLIC BLOOD PRESSURE: 193 MMHG

## 2023-05-16 DIAGNOSIS — Z79.811 ENCOUNTER FOR THERAPEUTIC DRUG LVL MONITORING: ICD-10-CM

## 2023-05-16 DIAGNOSIS — Z90.13 ACQUIRED ABSENCE OF BILATERAL BREASTS AND NIPPLES: Chronic | ICD-10-CM

## 2023-05-16 DIAGNOSIS — C50.911 MALIGNANT NEOPLASM OF UNSPECIFIED SITE OF RIGHT FEMALE BREAST: ICD-10-CM

## 2023-05-16 DIAGNOSIS — Z51.81 ENCOUNTER FOR THERAPEUTIC DRUG LVL MONITORING: ICD-10-CM

## 2023-05-16 DIAGNOSIS — Z98.49 CATARACT EXTRACTION STATUS, UNSPECIFIED EYE: Chronic | ICD-10-CM

## 2023-05-16 DIAGNOSIS — Z90.49 ACQUIRED ABSENCE OF OTHER SPECIFIED PARTS OF DIGESTIVE TRACT: Chronic | ICD-10-CM

## 2023-05-16 DIAGNOSIS — Z90.710 ACQUIRED ABSENCE OF BOTH CERVIX AND UTERUS: Chronic | ICD-10-CM

## 2023-05-16 DIAGNOSIS — Z98.82 BREAST IMPLANT STATUS: Chronic | ICD-10-CM

## 2023-05-16 LAB
ALBUMIN SERPL ELPH-MCNC: 4.8 G/DL
ALP BLD-CCNC: 117 U/L
ALT SERPL-CCNC: 16 U/L
ANION GAP SERPL CALC-SCNC: 15 MMOL/L
AST SERPL-CCNC: 24 U/L
BILIRUB SERPL-MCNC: 0.7 MG/DL
BUN SERPL-MCNC: 12 MG/DL
CALCIUM SERPL-MCNC: 11.3 MG/DL
CHLORIDE SERPL-SCNC: 100 MMOL/L
CO2 SERPL-SCNC: 26 MMOL/L
CREAT SERPL-MCNC: 0.8 MG/DL
EGFR: 75 ML/MIN/1.73M2
GLUCOSE SERPL-MCNC: 95 MG/DL
HCT VFR BLD CALC: 42.3 %
HGB BLD-MCNC: 14.7 G/DL
MCHC RBC-ENTMCNC: 31.5 PG
MCHC RBC-ENTMCNC: 34.8 G/DL
MCV RBC AUTO: 90.8 FL
PLATELET # BLD AUTO: 254 K/UL
PMV BLD: 10.4 FL
POTASSIUM SERPL-SCNC: 3.9 MMOL/L
PROT SERPL-MCNC: 8.2 G/DL
RBC # BLD: 4.66 M/UL
RBC # FLD: 12.3 %
SODIUM SERPL-SCNC: 141 MMOL/L
WBC # FLD AUTO: 10.11 K/UL

## 2023-05-16 PROCEDURE — 82330 ASSAY OF CALCIUM: CPT

## 2023-05-16 PROCEDURE — 85027 COMPLETE CBC AUTOMATED: CPT

## 2023-05-16 PROCEDURE — 84165 PROTEIN E-PHORESIS SERUM: CPT

## 2023-05-16 PROCEDURE — 86300 IMMUNOASSAY TUMOR CA 15-3: CPT

## 2023-05-16 PROCEDURE — 83519 RIA NONANTIBODY: CPT

## 2023-05-16 PROCEDURE — 80053 COMPREHEN METABOLIC PANEL: CPT

## 2023-05-16 PROCEDURE — 99214 OFFICE O/P EST MOD 30 MIN: CPT

## 2023-05-16 PROCEDURE — 84155 ASSAY OF PROTEIN SERUM: CPT

## 2023-05-16 PROCEDURE — 82378 CARCINOEMBRYONIC ANTIGEN: CPT

## 2023-05-16 PROCEDURE — 36415 COLL VENOUS BLD VENIPUNCTURE: CPT

## 2023-05-17 DIAGNOSIS — C50.911 MALIGNANT NEOPLASM OF UNSPECIFIED SITE OF RIGHT FEMALE BREAST: ICD-10-CM

## 2023-05-17 LAB
CANCER AG15-3 SERPL-ACNC: 7.8 U/ML
CEA SERPL-MCNC: 1.6 NG/ML

## 2023-05-18 ENCOUNTER — APPOINTMENT (OUTPATIENT)
Dept: HEMATOLOGY ONCOLOGY | Facility: CLINIC | Age: 78
End: 2023-05-18

## 2023-05-18 LAB — CA-I SERPL-SCNC: 5.4 MG/DL

## 2023-05-18 NOTE — HISTORY OF PRESENT ILLNESS
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 [de-identified] : 12/29/22\par Patient is here to follow up for breast cancer.\par She stopped taking Arimidex on 12/2/22 completing 7 years, since she is having a lot of joint stiffness but concerned of recurrence should she stop AI.\par She denies any new breast/chest concerns.\par She states that her cardio switched her statin to Repatha which she will start after discussing with us if it's ok to proceed with it.\par She does not have copy of her most recent bone density, she gets Prolia injections in [place] where she resides also.\par \par 5/16/23\par Patient is here to follow up for breast cancer.\par She continued taking Arimidex, tolerating well. She takes Vitamin D but not Calcium supplement because her Ca levels are elevated.\par She denies any new breast/chest concerns.\par She c/o of chronic back pain, MRI was done in [place] as per patient and her MD told her it was due to arthritic changes. She completed PT 2x/week x 2 months.\par She is going for MRI of the back, pending appt.

## 2023-05-18 NOTE — PHYSICAL EXAM
[Fully active, able to carry on all pre-disease performance without restriction] : Status 0 - Fully active, able to carry on all pre-disease performance without restriction [Normal] : affect appropriate [de-identified] : s/p bilateral mastectomy with reconstruction; chest/breast exam today is unrevealing.

## 2023-05-18 NOTE — ASSESSMENT
[FreeTextEntry1] : Patient is a 78 year old woman with H/O b/l breast cancer.\par \par Left side: [id number]\par Rt side [id number]\par HR +, Her2 Neg\par \par Pt received Adjuvant chemo with AC and Taxol and has been on Arimidex since 12/15.\par \par BCI showed no benefit from extended endocrine therapy, has a 17% risk of late distant recurrence (years 5-10) for HR+, LN +\par Since her risk of distant recurrence is high, recommend to continue Arimidex if she is able to tolerate.\par \par Blood work from 5/12/23 @ [facility] reviewed, Calcium level is 11.1.\par \par RECOMMENDATIONS:\par Previous notes reviewed and all relevant results discussed with Dr Brantley and were communicated to the patient.\par \par --  Continue Arimidex 1 mg, eRx sent.\par The side effects from aromatase inhibitor were discussed in detail including but not limited to hot flashes, weight gain, hair thinning, arthralgia, myalgia, bone loss, increased risk of osteopenic fractures and thrombo-embolism.\par She will take Vit D daily while on AI. Will hold off on Calcium pills for now since her levels are elevated.\par Her compliance and any side effects from such treatment were assessed at today's visit.\par \par -- Will do CBC, CMP, PTH, iCal, SPEP, CA 15-3 and CEA today.\par -- Follow with endocrine for thyroid findings.\par -- Continue to follow up with PCP, cardio, gyne and GI as recommended.\par \par #Osteopenia\par -- She is due for Bone density on 10/2023, script given.\par -- Encouraged weight bearing exercises as tolerated.\par \par #Leucocytosis\par -- Stable no e/o myeloproliferative process.\par -- Continue to monitor.\par \par RTC in 6 months\par \par Case was seen and discussed with Dr. Brantley who agreed with assessment and plan.\par

## 2023-05-19 LAB
ALBUMIN MFR SERPL ELPH: 57 %
ALBUMIN SERPL-MCNC: 4.7 G/DL
ALBUMIN/GLOB SERPL: 1.3 RATIO
ALPHA1 GLOB MFR SERPL ELPH: 4.8 %
ALPHA1 GLOB SERPL ELPH-MCNC: 0.4 G/DL
ALPHA2 GLOB MFR SERPL ELPH: 9.9 %
ALPHA2 GLOB SERPL ELPH-MCNC: 0.8 G/DL
B-GLOBULIN MFR SERPL ELPH: 8.9 %
B-GLOBULIN SERPL ELPH-MCNC: 0.7 G/DL
GAMMA GLOB FLD ELPH-MCNC: 1.6 G/DL
GAMMA GLOB MFR SERPL ELPH: 19.4 %
INTERPRETATION SERPL IEP-IMP: NORMAL
PROT SERPL-MCNC: 8.2 G/DL
PROT SERPL-MCNC: 8.2 G/DL

## 2023-05-22 DIAGNOSIS — E83.52 HYPERCALCEMIA: ICD-10-CM

## 2023-05-22 DIAGNOSIS — Z51.81 ENCOUNTER FOR THERAPEUTIC DRUG LEVEL MONITORING: ICD-10-CM

## 2023-05-22 LAB — PTH RELATED PROT SERPL-MCNC: <2 PMOL/L

## 2023-05-23 ENCOUNTER — APPOINTMENT (OUTPATIENT)
Dept: CARDIOLOGY | Facility: CLINIC | Age: 78
End: 2023-05-23
Payer: MEDICARE

## 2023-05-23 VITALS
BODY MASS INDEX: 28.52 KG/M2 | HEIGHT: 62 IN | WEIGHT: 155 LBS | SYSTOLIC BLOOD PRESSURE: 128 MMHG | DIASTOLIC BLOOD PRESSURE: 78 MMHG

## 2023-05-23 PROCEDURE — 99214 OFFICE O/P EST MOD 30 MIN: CPT

## 2023-05-23 NOTE — HISTORY OF PRESENT ILLNESS
[FreeTextEntry1] : 78 yo with hx HTN HLD FH cad. She has breast cancer HLD. She had covid 5/22. Figueroa stable. In part anxiety. . By surgery l breast chest pain. Not exertional . Told scaring. No palpitations

## 2023-05-23 NOTE — DISCUSSION/SUMMARY
[FreeTextEntry1] : 79 yo with hx HTN HLD FH cad. She has breast cancer HLD. She had covid 5/22. CHIRINOS stable.  Chest pain resolved. . Palpitations resolved  Leg pain walking since she took prolia.  Lexiscan 8/22 no ischemia. Holter 7/22 brief svt. CT scan chest reviewed. Repeat about 8/23. Calcium coronaries ground glass. On Rapatha . Dr Leblanc checks blood. Echo about 9/23\par

## 2023-05-23 NOTE — REVIEW OF SYSTEMS
[Fever] : no fever [Blurry Vision] : no blurred vision [Hearing Loss] : no hearing loss [SOB] : no shortness of breath [Dyspnea on exertion] : dyspnea during exertion [Cough] : no cough [Abdominal Pain] : no abdominal pain [Dysuria] : no dysuria [Joint Pain] : joint pain [Knee Pain] : knee pain [Lower Back Pain] : lower back pain [Rash] : no rash [Dizziness] : no dizziness [Confusion] : no confusion was observed [Easy Bleeding] : no tendency for easy bleeding

## 2023-05-25 ENCOUNTER — APPOINTMENT (OUTPATIENT)
Dept: HEMATOLOGY ONCOLOGY | Facility: CLINIC | Age: 78
End: 2023-05-25

## 2023-06-01 NOTE — H&P PST ADULT - NSICDXPASTMEDICALHX_GEN_ALL_CORE_FT
Hyperlipidemia Hyperlipidemia PAST MEDICAL HISTORY:  Breast cancer left and right /chemotherapy finished 2016 dec/radiation    High cholesterol     History of small bowel obstruction     HTN (hypertension)

## 2023-07-03 ENCOUNTER — RX RENEWAL (OUTPATIENT)
Age: 78
End: 2023-07-03

## 2023-07-17 ENCOUNTER — APPOINTMENT (OUTPATIENT)
Dept: SURGERY | Facility: CLINIC | Age: 78
End: 2023-07-17
Payer: MEDICARE

## 2023-07-17 VITALS
DIASTOLIC BLOOD PRESSURE: 88 MMHG | SYSTOLIC BLOOD PRESSURE: 128 MMHG | OXYGEN SATURATION: 96 % | HEIGHT: 62 IN | TEMPERATURE: 96.8 F | WEIGHT: 158 LBS | BODY MASS INDEX: 29.08 KG/M2 | HEART RATE: 98 BPM

## 2023-07-17 DIAGNOSIS — Z98.890 OTHER SPECIFIED POSTPROCEDURAL STATES: ICD-10-CM

## 2023-07-17 PROCEDURE — 99212 OFFICE O/P EST SF 10 MIN: CPT

## 2023-07-17 NOTE — ASSESSMENT
[FreeTextEntry1] :   Benign breast cancer surveillance examination.  The patient will return in 1 year for reevaluation or sooner as needed.  All their questions were answered and they understand and agree.

## 2023-07-17 NOTE — HISTORY OF PRESENT ILLNESS
[de-identified] : The patient returns with her  for her annual breast cancer surveillance examination.  She notes no new symptoms or changes in either breast region but is still dissatisfied with her cosmetic outcome.  She had a bilateral mastectomy, left axillary node dissection, right axillary sentinel node biopsy, and left chest wall RT with bilateral reconstructions, July 2015.  Staging was T2N1 left and T1CN0 right.  She also had postoperative systemic chemotherapy and has switched her oncology follow-up to Dr. SUKUMAR RICHARDS at this location.  She  recently switched her endocrine therapy to anastrozole. [de-identified] :   She reports a recent diagnosis of spinal stenosis on MRI.

## 2023-07-17 NOTE — PHYSICAL EXAM
[de-identified] :   Healthy for stated age [de-identified] :  no adenopathy [de-identified] :  bilateral breast implants appear to be intact and symmetrical.  No new masses or suspicious areas noted in either breast region.  No axillary adenopathy bilaterally.  No upper extremity lymphedema bilaterally.

## 2023-07-31 ENCOUNTER — RX RENEWAL (OUTPATIENT)
Age: 78
End: 2023-07-31

## 2023-08-07 ENCOUNTER — RESULT REVIEW (OUTPATIENT)
Age: 78
End: 2023-08-07

## 2023-08-07 ENCOUNTER — OUTPATIENT (OUTPATIENT)
Dept: OUTPATIENT SERVICES | Facility: HOSPITAL | Age: 78
LOS: 1 days | End: 2023-08-07
Payer: MEDICARE

## 2023-08-07 DIAGNOSIS — Z00.8 ENCOUNTER FOR OTHER GENERAL EXAMINATION: ICD-10-CM

## 2023-08-07 DIAGNOSIS — Z98.82 BREAST IMPLANT STATUS: Chronic | ICD-10-CM

## 2023-08-07 DIAGNOSIS — Z90.49 ACQUIRED ABSENCE OF OTHER SPECIFIED PARTS OF DIGESTIVE TRACT: Chronic | ICD-10-CM

## 2023-08-07 DIAGNOSIS — Z90.710 ACQUIRED ABSENCE OF BOTH CERVIX AND UTERUS: Chronic | ICD-10-CM

## 2023-08-07 DIAGNOSIS — R06.02 SHORTNESS OF BREATH: ICD-10-CM

## 2023-08-07 DIAGNOSIS — Z90.13 ACQUIRED ABSENCE OF BILATERAL BREASTS AND NIPPLES: Chronic | ICD-10-CM

## 2023-08-07 DIAGNOSIS — Z98.49 CATARACT EXTRACTION STATUS, UNSPECIFIED EYE: Chronic | ICD-10-CM

## 2023-08-07 PROCEDURE — 71250 CT THORAX DX C-: CPT

## 2023-08-07 PROCEDURE — 71250 CT THORAX DX C-: CPT | Mod: 26

## 2023-08-08 DIAGNOSIS — R06.02 SHORTNESS OF BREATH: ICD-10-CM

## 2023-08-28 ENCOUNTER — RX RENEWAL (OUTPATIENT)
Age: 78
End: 2023-08-28

## 2023-09-07 ENCOUNTER — RX RENEWAL (OUTPATIENT)
Age: 78
End: 2023-09-07

## 2023-09-19 ENCOUNTER — APPOINTMENT (OUTPATIENT)
Dept: HEMATOLOGY ONCOLOGY | Facility: CLINIC | Age: 78
End: 2023-09-19
Payer: MEDICARE

## 2023-09-19 ENCOUNTER — LABORATORY RESULT (OUTPATIENT)
Age: 78
End: 2023-09-19

## 2023-09-19 ENCOUNTER — OUTPATIENT (OUTPATIENT)
Dept: OUTPATIENT SERVICES | Facility: HOSPITAL | Age: 78
LOS: 1 days | End: 2023-09-19
Payer: MEDICARE

## 2023-09-19 VITALS
HEIGHT: 62 IN | BODY MASS INDEX: 29.08 KG/M2 | DIASTOLIC BLOOD PRESSURE: 93 MMHG | WEIGHT: 158 LBS | SYSTOLIC BLOOD PRESSURE: 192 MMHG | HEART RATE: 76 BPM

## 2023-09-19 DIAGNOSIS — C50.911 MALIGNANT NEOPLASM OF UNSPECIFIED SITE OF RIGHT FEMALE BREAST: ICD-10-CM

## 2023-09-19 DIAGNOSIS — Z90.49 ACQUIRED ABSENCE OF OTHER SPECIFIED PARTS OF DIGESTIVE TRACT: Chronic | ICD-10-CM

## 2023-09-19 DIAGNOSIS — Z98.82 BREAST IMPLANT STATUS: Chronic | ICD-10-CM

## 2023-09-19 DIAGNOSIS — Z51.81 ENCOUNTER FOR THERAPEUTIC DRUG LEVEL MONITORING: ICD-10-CM

## 2023-09-19 DIAGNOSIS — E83.52 HYPERCALCEMIA: ICD-10-CM

## 2023-09-19 DIAGNOSIS — Z90.13 ACQUIRED ABSENCE OF BILATERAL BREASTS AND NIPPLES: Chronic | ICD-10-CM

## 2023-09-19 DIAGNOSIS — Z90.710 ACQUIRED ABSENCE OF BOTH CERVIX AND UTERUS: Chronic | ICD-10-CM

## 2023-09-19 DIAGNOSIS — Z98.49 CATARACT EXTRACTION STATUS, UNSPECIFIED EYE: Chronic | ICD-10-CM

## 2023-09-19 PROCEDURE — 99214 OFFICE O/P EST MOD 30 MIN: CPT

## 2023-09-19 PROCEDURE — 86300 IMMUNOASSAY TUMOR CA 15-3: CPT

## 2023-09-19 PROCEDURE — 85027 COMPLETE CBC AUTOMATED: CPT

## 2023-09-19 PROCEDURE — 82378 CARCINOEMBRYONIC ANTIGEN: CPT

## 2023-09-19 PROCEDURE — 80053 COMPREHEN METABOLIC PANEL: CPT

## 2023-09-20 LAB
ALBUMIN SERPL ELPH-MCNC: 4.7 G/DL
ALP BLD-CCNC: 119 U/L
ALT SERPL-CCNC: 18 U/L
ANION GAP SERPL CALC-SCNC: 17 MMOL/L
AST SERPL-CCNC: 33 U/L
BILIRUB SERPL-MCNC: 0.6 MG/DL
BUN SERPL-MCNC: 16 MG/DL
CALCIUM SERPL-MCNC: 11.3 MG/DL
CANCER AG15-3 SERPL-ACNC: 7.4 U/ML
CEA SERPL-MCNC: 1.2 NG/ML
CHLORIDE SERPL-SCNC: 103 MMOL/L
CO2 SERPL-SCNC: 21 MMOL/L
CREAT SERPL-MCNC: 0.8 MG/DL
EGFR: 75 ML/MIN/1.73M2
GLUCOSE SERPL-MCNC: 86 MG/DL
HCT VFR BLD CALC: 41.6 %
HGB BLD-MCNC: 14.4 G/DL
MCHC RBC-ENTMCNC: 31.8 PG
MCHC RBC-ENTMCNC: 34.6 G/DL
MCV RBC AUTO: 91.8 FL
PLATELET # BLD AUTO: 146 K/UL
PMV BLD: 11.4 FL
POTASSIUM SERPL-SCNC: 4.3 MMOL/L
PROT SERPL-MCNC: 7.8 G/DL
RBC # BLD: 4.53 M/UL
RBC # FLD: 12.3 %
SODIUM SERPL-SCNC: 141 MMOL/L
WBC # FLD AUTO: 12.32 K/UL

## 2023-09-25 ENCOUNTER — APPOINTMENT (OUTPATIENT)
Dept: PLASTIC SURGERY | Facility: CLINIC | Age: 78
End: 2023-09-25
Payer: MEDICARE

## 2023-09-25 PROCEDURE — 99214 OFFICE O/P EST MOD 30 MIN: CPT

## 2023-09-26 ENCOUNTER — RX RENEWAL (OUTPATIENT)
Age: 78
End: 2023-09-26

## 2023-10-02 ENCOUNTER — APPOINTMENT (OUTPATIENT)
Dept: CARDIOLOGY | Facility: CLINIC | Age: 78
End: 2023-10-02
Payer: MEDICARE

## 2023-10-02 VITALS
OXYGEN SATURATION: 98 % | HEIGHT: 62 IN | SYSTOLIC BLOOD PRESSURE: 122 MMHG | DIASTOLIC BLOOD PRESSURE: 80 MMHG | WEIGHT: 157 LBS | BODY MASS INDEX: 28.89 KG/M2 | HEART RATE: 72 BPM

## 2023-10-02 DIAGNOSIS — C50.919 MALIGNANT NEOPLASM OF UNSPECIFIED SITE OF UNSPECIFIED FEMALE BREAST: ICD-10-CM

## 2023-10-02 PROCEDURE — 99214 OFFICE O/P EST MOD 30 MIN: CPT

## 2023-10-02 PROCEDURE — 93000 ELECTROCARDIOGRAM COMPLETE: CPT

## 2024-01-10 ENCOUNTER — RX RENEWAL (OUTPATIENT)
Age: 79
End: 2024-01-10

## 2024-05-21 ENCOUNTER — APPOINTMENT (OUTPATIENT)
Dept: CARDIOLOGY | Facility: CLINIC | Age: 79
End: 2024-05-21
Payer: MEDICARE

## 2024-05-21 VITALS
DIASTOLIC BLOOD PRESSURE: 90 MMHG | HEIGHT: 62 IN | SYSTOLIC BLOOD PRESSURE: 168 MMHG | WEIGHT: 155 LBS | OXYGEN SATURATION: 97 % | HEART RATE: 75 BPM | BODY MASS INDEX: 28.52 KG/M2

## 2024-05-21 DIAGNOSIS — E78.5 HYPERLIPIDEMIA, UNSPECIFIED: ICD-10-CM

## 2024-05-21 DIAGNOSIS — R00.2 PALPITATIONS: ICD-10-CM

## 2024-05-21 DIAGNOSIS — R06.02 SHORTNESS OF BREATH: ICD-10-CM

## 2024-05-21 DIAGNOSIS — I10 ESSENTIAL (PRIMARY) HYPERTENSION: ICD-10-CM

## 2024-05-21 PROCEDURE — 99214 OFFICE O/P EST MOD 30 MIN: CPT

## 2024-05-21 RX ORDER — POTASSIUM CHLORIDE 750 MG/1
10 TABLET, FILM COATED, EXTENDED RELEASE ORAL
Qty: 270 | Refills: 3 | Status: ACTIVE | COMMUNITY
Start: 2022-09-06

## 2024-05-21 RX ORDER — ANASTROZOLE 1 MG/1
1 TABLET ORAL
Refills: 0 | Status: DISCONTINUED | COMMUNITY
End: 2024-05-21

## 2024-05-21 RX ORDER — MONTELUKAST 10 MG/1
10 TABLET, FILM COATED ORAL
Qty: 90 | Refills: 3 | Status: DISCONTINUED | COMMUNITY
Start: 2022-09-26 | End: 2024-05-21

## 2024-05-21 RX ORDER — MONTELUKAST 10 MG/1
10 TABLET, FILM COATED ORAL
Qty: 30 | Refills: 0 | Status: DISCONTINUED | COMMUNITY
Start: 2019-08-29 | End: 2024-05-21

## 2024-05-21 RX ORDER — METRONIDAZOLE 7.5 MG/G
0.75 GEL VAGINAL
Qty: 1 | Refills: 0 | Status: DISCONTINUED | COMMUNITY
Start: 2019-08-02 | End: 2024-05-21

## 2024-05-21 RX ORDER — DILTIAZEM HYDROCHLORIDE 240 MG/1
240 CAPSULE, EXTENDED RELEASE ORAL DAILY
Refills: 0 | Status: ACTIVE | COMMUNITY
Start: 2019-07-22

## 2024-05-21 RX ORDER — AMMONIUM LACTATE 12 %
12 CREAM (GRAM) TOPICAL
Qty: 140 | Refills: 0 | Status: ACTIVE | COMMUNITY
Start: 2022-08-02

## 2024-05-21 RX ORDER — ANASTROZOLE 1 MG/1
1 TABLET ORAL
Qty: 90 | Refills: 3 | Status: ACTIVE | COMMUNITY
Start: 2023-05-16

## 2024-05-21 RX ORDER — HYDROCHLOROTHIAZIDE 12.5 MG/1
12.5 TABLET ORAL DAILY
Qty: 90 | Refills: 3 | Status: ACTIVE | COMMUNITY
Start: 2023-08-08

## 2024-05-21 RX ORDER — CLOTRIMAZOLE AND BETAMETHASONE DIPROPIONATE 10; .5 MG/G; MG/G
1-0.05 CREAM TOPICAL TWICE DAILY
Qty: 1 | Refills: 0 | Status: DISCONTINUED | COMMUNITY
Start: 2019-08-01 | End: 2024-05-21

## 2024-05-21 RX ORDER — LOSARTAN POTASSIUM 100 MG/1
100 TABLET, FILM COATED ORAL DAILY
Refills: 0 | Status: DISCONTINUED | COMMUNITY
Start: 2019-07-22 | End: 2024-05-21

## 2024-05-21 RX ORDER — DOXYCYCLINE HYCLATE 100 MG/1
100 TABLET ORAL
Qty: 3 | Refills: 2 | Status: DISCONTINUED | COMMUNITY
Start: 2022-09-26 | End: 2024-05-21

## 2024-05-21 RX ORDER — MONTELUKAST 10 MG/1
10 TABLET, FILM COATED ORAL
Qty: 30 | Refills: 0 | Status: ACTIVE | COMMUNITY
Start: 2019-08-02

## 2024-05-21 RX ORDER — VALSARTAN 80 MG/1
80 TABLET, COATED ORAL DAILY
Qty: 90 | Refills: 3 | Status: ACTIVE | COMMUNITY
Start: 2024-05-21 | End: 1900-01-01

## 2024-05-21 NOTE — HISTORY OF PRESENT ILLNESS
[FreeTextEntry1] : 78 yo with hx HTN HLD FH cad. She has breast cancer HLD.  Figueroa stable. In part anxiety. . By surgery l breast chest pain. Not exertional . Told scaring.No change.  No palpitations

## 2024-05-21 NOTE — DISCUSSION/SUMMARY
[FreeTextEntry1] : 78 yo with hx HTN HLD FH cad. She has breast cancer HLD. She had covid 5/22. CHIRINOS stable.  Chest pain stable.  Palpitations resolved Leg pain walking since she took prolia.  Lexiscan 8/22 no ischemia. Holter 7/22 brief svt. CT scan chest reviewed. Repeat about 3/24. . Calcium coronaries ground glass. On Rapatha . Dr Leblanc checks blood. Last echo 9/22. Hair loss . But stressed. Told exercise. Ekg reviewed. Calcium high . Followed by Dr Gary Leblanc. Echo possibly next year. BP high today Reviewed low na diet. She had chinese food yesterday. Will stop Cozaar. Try valsartan 80. Bp in 2 weeks  Will check blood at [facility] in 1 week > Bp at Dr López

## 2024-05-22 ENCOUNTER — RESULT REVIEW (OUTPATIENT)
Age: 79
End: 2024-05-22

## 2024-05-22 ENCOUNTER — OUTPATIENT (OUTPATIENT)
Dept: OUTPATIENT SERVICES | Facility: HOSPITAL | Age: 79
LOS: 1 days | End: 2024-05-22
Payer: MEDICARE

## 2024-05-22 DIAGNOSIS — Z13.820 ENCOUNTER FOR SCREENING FOR OSTEOPOROSIS: ICD-10-CM

## 2024-05-22 DIAGNOSIS — Z90.13 ACQUIRED ABSENCE OF BILATERAL BREASTS AND NIPPLES: Chronic | ICD-10-CM

## 2024-05-22 DIAGNOSIS — Z90.49 ACQUIRED ABSENCE OF OTHER SPECIFIED PARTS OF DIGESTIVE TRACT: Chronic | ICD-10-CM

## 2024-05-22 DIAGNOSIS — Z00.00 ENCOUNTER FOR GENERAL ADULT MEDICAL EXAMINATION WITHOUT ABNORMAL FINDINGS: ICD-10-CM

## 2024-05-22 DIAGNOSIS — Z90.710 ACQUIRED ABSENCE OF BOTH CERVIX AND UTERUS: Chronic | ICD-10-CM

## 2024-05-22 DIAGNOSIS — Z98.49 CATARACT EXTRACTION STATUS, UNSPECIFIED EYE: Chronic | ICD-10-CM

## 2024-05-22 DIAGNOSIS — Z98.82 BREAST IMPLANT STATUS: Chronic | ICD-10-CM

## 2024-05-22 PROCEDURE — 77080 DXA BONE DENSITY AXIAL: CPT

## 2024-05-22 PROCEDURE — 77080 DXA BONE DENSITY AXIAL: CPT | Mod: 26

## 2024-05-23 DIAGNOSIS — Z13.820 ENCOUNTER FOR SCREENING FOR OSTEOPOROSIS: ICD-10-CM

## 2024-05-28 ENCOUNTER — APPOINTMENT (OUTPATIENT)
Age: 79
End: 2024-05-28
Payer: MEDICARE

## 2024-05-28 VITALS
WEIGHT: 157 LBS | OXYGEN SATURATION: 97 % | BODY MASS INDEX: 28.89 KG/M2 | DIASTOLIC BLOOD PRESSURE: 94 MMHG | HEART RATE: 84 BPM | RESPIRATION RATE: 19 BRPM | TEMPERATURE: 98.2 F | SYSTOLIC BLOOD PRESSURE: 142 MMHG | HEIGHT: 62 IN

## 2024-05-28 DIAGNOSIS — M85.80 OTHER SPECIFIED DISORDERS OF BONE DENSITY AND STRUCTURE, UNSPECIFIED SITE: ICD-10-CM

## 2024-05-28 DIAGNOSIS — M81.0 AGE-RELATED OSTEOPOROSIS WITHOUT CURRENT PATHOLOGICAL FRACTURE: ICD-10-CM

## 2024-05-28 DIAGNOSIS — M81.6 LOCALIZED OSTEOPOROSIS [LEQUESNE]: ICD-10-CM

## 2024-05-28 DIAGNOSIS — Z79.811 LONG TERM (CURRENT) USE OF AROMATASE INHIBITORS: ICD-10-CM

## 2024-05-28 PROCEDURE — G2211 COMPLEX E/M VISIT ADD ON: CPT

## 2024-05-28 PROCEDURE — 99214 OFFICE O/P EST MOD 30 MIN: CPT

## 2024-05-29 NOTE — PHYSICAL EXAM
[Fully active, able to carry on all pre-disease performance without restriction] : Status 0 - Fully active, able to carry on all pre-disease performance without restriction [Normal] : affect appropriate [de-identified] : Overweight. [de-identified] : s/p bilateral mastectomy with reconstruction; chest/breast exam today is unrevealing.

## 2024-05-29 NOTE — HISTORY OF PRESENT ILLNESS
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 [de-identified] : 12/29/22 Patient is here to follow up for breast cancer. She stopped taking Arimidex on 12/2/22 completing 7 years, since she is having a lot of joint stiffness but concerned of recurrence should she stop AI. She denies any new breast/chest concerns. She states that her cardio switched her statin to Repatha which she will start after discussing with us if it's ok to proceed with it. She does not have copy of her most recent bone density, she gets Prolia injections in [place] where she resides also.  5/16/23 Patient is here to follow up for breast cancer. She continued taking Arimidex, tolerating well. She takes Vitamin D but not Calcium supplement because her Ca levels are elevated. She denies any new breast/chest concerns. She c/o of chronic back pain, MRI was done in [place] as per patient and her MD told her it was due to arthritic changes. She completed PT 2x/week x 2 months. She is going for MRI of the back, pending appt.  9/19/23 Patient is here to follow up for breast cancer. She continued taking Arimidex, tolerating well. She takes Vitamin D but not Calcium supplement because her Ca levels are elevated. She denies any new breast/chest concerns. She c/o of chronic back pain, following up with Dr Chowdhury.  5/28/24 Patient is here to follow up for breast cancer. She continued taking Arimidex since 2/2015 tolerating well. Of note, she was off Arimidex x 3 weeks in 2022. She takes Vitamin D but not Calcium supplement because her Ca levels are elevated. She denies any new breast/chest concerns. She c/o of chronic back pain, following up with Dr Chris in [place]. Back pain relieved with Epidural injections. Last bone density done 5/22/24 which showed Osteoporosis.  She was on Prolia (6x) last injection 2022.  She was on biphosphonates for ~5yrs. She feels she developed spinal stenosis from taking Prolia.  Back pain relieved with epidural injections in early 2024.

## 2024-06-10 ENCOUNTER — APPOINTMENT (OUTPATIENT)
Dept: PLASTIC SURGERY | Facility: CLINIC | Age: 79
End: 2024-06-10
Payer: MEDICARE

## 2024-06-10 PROCEDURE — 99212 OFFICE O/P EST SF 10 MIN: CPT

## 2024-06-10 NOTE — PHYSICAL EXAM
[de-identified] : well-developed female, NAD [de-identified] : bilateral breast implants soft and in good position, incisions well healed, no evidence of seroma right breast with improved superior fullness, implant in better position, soft, nontender capsular contracture grade 3, left breast with grade 4 capsular contracture and pec/axillary fold banding, nontender; no supple than last visit; mastectomy flap telangiectasia (inferior) stable right breast  volume > left breast

## 2024-06-10 NOTE — HISTORY OF PRESENT ILLNESS
[FreeTextEntry1] : 72 yo with history of bilateral breast cancer (dx 2015-left stage T2N1 invasive carcinoma, right- T1CN) invasive carcinoma, ER/CO positive) s/p left MRM and right simple mastectomy and SLN followed by subpectoral TE placement, ?mesh, and silicone implant exchange with Dr. Brown at [facility]. Pt also underwent CTX completed Dec/16 and XRT completed in 2016. Denies wound healing issues. Pt now c/o dissatisfaction with cosmetic result as well as left breast discomfort. Sought second opinion with Dr. Brown in [place].   Pt was also recently diagnosed with hypercalcemia on lab work, pending endocrinology consultation for further workup. Nonsmoker  Interval hx (6/7/19): Here for follow up to confirm her breast reconstruction revision plan. Pt just returned from [place] were she was for the past 6 months. Her  is present for the encounter. She reports that her hypercalcemia is now stable and has be taken off of calcium therapy (Dr. Lama). She was evaluated by Dr. Renae who reports no evidence of breast disease. Patient is here to confirm her breast revision surgical plan. Pt is still interested in less invasive breast reconstructive option.   Interval hx (7/12/19): Pt presents for bandage change- POD #3 s/p bilateral breast implant exchange, AlloDerm placement, and capsulectomy. C/o right breast drainage saturating bandage. Denies pain, f/c, or purulence.   Interval hx (7/15/19). Patient presents today POD#6 s/p bilateral breast implant exchange, AlloDerm placement, and capsulectomy. Doing well and very pleased with her result reporting improvement in pain, discomfort and overall aesthetics of her breasts. Compliant with oral antibiotics and surgical bra. Denies any fever, chills, drainage or bleeding.   interval hx (7/22/19): Here for POD#13 s/p bilateral breast implant exchange.  Doing well.  Very happy with results.  Denies fevers, chills, redness.  completed course of PO abx.  compliant with bandeau and surgical bra use.  Interval hx (8/2/19):  Here for POD#24 s/p bilateral breast implant exchange.  Went for right breast sono with no evidence of seroma.  Denies fevers, chills, redness.  c/o of right breast superior pole breast asymmetry and difference in volume.  Also c/o left breast soft tissue banding returning.  Interval hx (8/29/19): Pt presents for 1st postop visit- POD #7 s/p revision of right breast reconstruction, capsulectomy and implant exchange. Reports she feels this is an improvement. Denies pain or f/c.  Interval hx (9/5/19): 2nd  post-op visit POD#14 s/p revision of right breast reconstruction, partial capuslectomy and implant exchange. pt continue to feel improvement of right breast; no longer has right breast tightness.  Happy with result  Interval hx (10/11/19): 7 weeks s/p right breast reconstruction, partial capsulectomy and implant exchange.  Compliant with bandeau use.  Also taken Singulair as instructed.  Feels improvement of capsular contracture in both breasts (R>L).  She reports the right breast implant has improved position as well.  She is happy with her results.    Interval hx (6/15/20):  10 month s/p right breast reconstruction revision and implant exchange. Patient completed the singulair 4th trial.  She reports that she had left breast discomfort with associated chest wall tightness.  She then started course of stretching therapy with improvement of implant pressure.  Patient happy with breast reconstruction revision.  Able to wear swimsuit  Interval hx (10/16/20):  Here here 14 month s/p right breast reconstruction and implant exchange.  Interval hx (9/20/21):  here for 25 months s/p right breast recon, capsulectomy and implant exchange. right breast implant is softer than left breast.   performing breast exams, no new breast lumps.  Interval hx (10/8/21): Pt here with  for his office visit.  able to locate area of lump in left breast.  Interval hx (9/26/22): 3 years s/p BL breast reconstruction revision and implant exchange. No breast reconstruction complaints.  She felt the Singulair helped the appearance of her breasts.  Interval hx (9/25/23):  4 years s/p BL breast reconstruction revision and implant exchange.  No breast reconstruction complaints.  She felt the Singulair helped the appearance of her breasts, but only takes it occasionally because of HLD.  Interval hx (6/10/24):  4.75 years s/p BL breast reconstruction revision and implant exchange.  h/o left breast XRT. No breast reconstruction complaints.  Completed Singulair 1 month ago.  complaint of BL breast pain and tightness.  pt to undergo BL breast US for implant surveillance in 8/2024

## 2024-06-10 NOTE — ASSESSMENT
[FreeTextEntry1] : 72 y/o F with hx of BL Breast CA s/p Chemotherapy and left breast XRT, s/p TE and silicone implant reconstruction (submuscular) with bilateral symptomatic capsular contracture. s/p bilateral breast implant exchange, AlloDerm placement, and capsulectomy 7/9/19; was unhappy with breast asymmetry  4.75 years s/p revision of right breast reconstruction, capsulectomy and implant exchange, doing well Recurrent XRT-related capsular contracture, grade 3 stable Pt comfortable with excellent BUE ROM  Left 10:00 lateral border of pec with c/w scar tissue on US 10/2022  -c/w implant massage and BUE ROM exercises -no indication of implant abx ppx -DC singulair, no indication  -All questions were answered -follow up in 3 months after breast US (5-yr FDA implant surveillance, rx given previously)

## 2024-06-11 RX ORDER — EVOLOCUMAB 140 MG/ML
140 INJECTION, SOLUTION SUBCUTANEOUS
Qty: 2 | Refills: 3 | Status: ACTIVE | COMMUNITY
Start: 2022-12-22 | End: 1900-01-01

## 2024-06-17 ENCOUNTER — APPOINTMENT (OUTPATIENT)
Dept: SURGERY | Facility: CLINIC | Age: 79
End: 2024-06-17
Payer: MEDICARE

## 2024-06-17 VITALS
HEART RATE: 83 BPM | HEIGHT: 62 IN | SYSTOLIC BLOOD PRESSURE: 134 MMHG | DIASTOLIC BLOOD PRESSURE: 82 MMHG | OXYGEN SATURATION: 97 % | BODY MASS INDEX: 28.71 KG/M2 | TEMPERATURE: 97.1 F | WEIGHT: 156 LBS

## 2024-06-17 DIAGNOSIS — C50.911 MALIGNANT NEOPLASM OF UNSPECIFIED SITE OF RIGHT FEMALE BREAST: ICD-10-CM

## 2024-06-17 DIAGNOSIS — C50.912 MALIGNANT NEOPLASM OF UNSPECIFIED SITE OF RIGHT FEMALE BREAST: ICD-10-CM

## 2024-06-17 PROCEDURE — 99212 OFFICE O/P EST SF 10 MIN: CPT

## 2024-06-18 PROBLEM — C50.911 BILATERAL BREAST CANCER: Status: ACTIVE | Noted: 2018-06-07

## 2024-06-18 NOTE — ASSESSMENT
[FreeTextEntry1] : Benign breast cancer surveillance examination.  The patient will return in 1 year for reexam, or sooner as needed.  No breast imaging studies are thought to be necessary.

## 2024-06-18 NOTE — HISTORY OF PRESENT ILLNESS
[de-identified] : The patient returns for her scheduled breast cancer surveillance visit, and she notes no new symptoms or changes in either breast region.  She had a bilateral mastectomy and left axillary lymph node dissection, right axillary sentinel node biopsy, in July 2015 for a T2 N1 IDC of the left breast and a T1c N0 IDC on the right.  She elected to stop her Arimidex after 7 years due to worsening osteopenia.  She is currently on Lyrica for peripheral neuropathy per her pain management physician and is waiting to see if he has any improvement of her symptoms in that regard.

## 2024-08-13 ENCOUNTER — RESULT REVIEW (OUTPATIENT)
Age: 79
End: 2024-08-13

## 2024-08-22 NOTE — ASU PATIENT PROFILE, ADULT - HARM RISK FACTORS
Anesthesia Post-op Note    Patient: Dianna Hein  Procedure(s) Performed: LEFT URETEROSCOPY (Left: Ureter)   Anesthesia type: General    Vitals Value Taken Time   Temp 36.4 °C (97.5 °F) 08/22/24 1500   Pulse 74 08/22/24 1500   Resp 18 08/22/24 1500   SpO2 98 % 08/22/24 1500   /80 08/22/24 1500         Patient Location: Phase II  Post-op Vital Signs:stable  Level of Consciousness: participates in exam, oriented, awake and alert  Respiratory Status: spontaneous ventilation  Cardiovascular blood pressure returned to baseline  Hydration: euvolemic  Pain Management: well controlled  Handoff: Handoff to receiving clinician was performed and questions were answered  Vomiting: none  Nausea: None  Airway Patency:patent  Post-op Assessment: awake, alert, appropriately conversant, or baseline, no complications, patient tolerated procedure well, no evidence of recall, dentition within defined limits, moving all extremities and No Corneal Abrasion      No notable events documented.                       no

## 2024-08-26 ENCOUNTER — APPOINTMENT (OUTPATIENT)
Dept: CARDIOLOGY | Facility: CLINIC | Age: 79
End: 2024-08-26
Payer: MEDICARE

## 2024-08-26 VITALS
BODY MASS INDEX: 29.08 KG/M2 | HEART RATE: 80 BPM | SYSTOLIC BLOOD PRESSURE: 122 MMHG | DIASTOLIC BLOOD PRESSURE: 80 MMHG | HEIGHT: 62 IN | OXYGEN SATURATION: 96 % | WEIGHT: 158 LBS

## 2024-08-26 DIAGNOSIS — R00.2 PALPITATIONS: ICD-10-CM

## 2024-08-26 DIAGNOSIS — I10 ESSENTIAL (PRIMARY) HYPERTENSION: ICD-10-CM

## 2024-08-26 DIAGNOSIS — E78.5 HYPERLIPIDEMIA, UNSPECIFIED: ICD-10-CM

## 2024-08-26 DIAGNOSIS — C50.911 MALIGNANT NEOPLASM OF UNSPECIFIED SITE OF RIGHT FEMALE BREAST: ICD-10-CM

## 2024-08-26 DIAGNOSIS — C50.912 MALIGNANT NEOPLASM OF UNSPECIFIED SITE OF RIGHT FEMALE BREAST: ICD-10-CM

## 2024-08-26 PROCEDURE — 99213 OFFICE O/P EST LOW 20 MIN: CPT

## 2024-08-26 PROCEDURE — G2211 COMPLEX E/M VISIT ADD ON: CPT

## 2024-08-26 NOTE — PHYSICAL EXAM
[Well Developed] : well developed [Well Nourished] : well nourished [Normal Conjunctiva] : normal conjunctiva [Normal Venous Pressure] : normal venous pressure [Normal Rate] : normal [Rhythm Regular] : regular [Normal S1] : normal S1 [Normal S2] : normal S2 [I] : a grade 1 [Clear Lung Fields] : clear lung fields [Soft] : abdomen soft [Normal Gait] : normal gait [No Edema] : no edema [Moves all extremities] : moves all extremities [Alert and Oriented] : alert and oriented [S3] : no S3 [S4] : no S4

## 2024-08-26 NOTE — HISTORY OF PRESENT ILLNESS
[FreeTextEntry1] : 80 yo with hx HTN HLD FH cad. She has breast cancer HLD.  Figueroa stable. In part anxiety. . By surgery l breast chest pain. Not exertional . Told scaring.No change.  No palpitations

## 2024-08-26 NOTE — DISCUSSION/SUMMARY
[FreeTextEntry1] : 80 yo with hx HTN HLD FH cad. She has breast cancer HLD. She had covid 5/22. CHIRINOS stable.  Chest pain stable.  Palpitations resolved Leg pain walking since she took prolia.To get MRI knee.   Lexiscan 8/22 no ischemia. Holter 7/22 brief svt. CT scan chest reviewed. Repeat about 3/24. . Calcium coronaries ground glass. On Rapatha . Dr Leblanc checks blood. Last echo 9/22.. Told exercise. Ekg reviewed. Calcium high . Followed by Dr Gary Leblanc. .. BP good today Reviewed low na diet. On  valsartan 80. Bp good.Blood outside reviewed

## 2024-08-26 NOTE — REVIEW OF SYSTEMS
[Dyspnea on exertion] : dyspnea during exertion [Joint Pain] : joint pain [Knee Pain] : knee pain [Lower Back Pain] : lower back pain [Fever] : no fever [Blurry Vision] : no blurred vision [Hearing Loss] : no hearing loss [SOB] : no shortness of breath [Cough] : no cough [Abdominal Pain] : no abdominal pain [Dysuria] : no dysuria [Rash] : no rash [Dizziness] : no dizziness [Confusion] : no confusion was observed [Easy Bleeding] : no tendency for easy bleeding

## 2024-09-23 ENCOUNTER — APPOINTMENT (OUTPATIENT)
Dept: PLASTIC SURGERY | Facility: CLINIC | Age: 79
End: 2024-09-23
Payer: MEDICARE

## 2024-09-23 PROCEDURE — 99213 OFFICE O/P EST LOW 20 MIN: CPT

## 2024-09-23 NOTE — PHYSICAL EXAM
[de-identified] : well-developed female, NAD [de-identified] : Unchanged exam bilateral breast implants soft and in good position, incisions well healed, no evidence of seroma right breast with improved superior fullness, implant in better position, soft, nontender capsular contracture grade 3, left breast with grade 4 capsular contracture and pec/axillary fold banding, nontender; no supple than last visit; mastectomy flap telangiectasia (inferior) stable right breast  volume > left breast [de-identified] : BL hands: FROM, SILT, cap refill, LIF with DIP prominent arthropathy, negative Tinel's and Phalens, no TTP

## 2024-09-23 NOTE — HISTORY OF PRESENT ILLNESS
[FreeTextEntry1] : 74 yo with history of bilateral breast cancer (dx 2015-left stage T2N1 invasive carcinoma, right- T1CN) invasive carcinoma, ER/TX positive) s/p left MRM and right simple mastectomy and SLN followed by subpectoral TE placement, ?mesh, and silicone implant exchange with Dr. Brown at [facility]. Pt also underwent CTX completed Dec/16 and XRT completed in 2016. Denies wound healing issues. Pt now c/o dissatisfaction with cosmetic result as well as left breast discomfort. Sought second opinion with Dr. Brown in [place].   Pt was also recently diagnosed with hypercalcemia on lab work, pending endocrinology consultation for further workup. Nonsmoker  Interval hx (6/7/19): Here for follow up to confirm her breast reconstruction revision plan. Pt just returned from [place] were she was for the past 6 months. Her  is present for the encounter. She reports that her hypercalcemia is now stable and has be taken off of calcium therapy (Dr. Lama). She was evaluated by Dr. Renae who reports no evidence of breast disease. Patient is here to confirm her breast revision surgical plan. Pt is still interested in less invasive breast reconstructive option.   Interval hx (7/12/19): Pt presents for bandage change- POD #3 s/p bilateral breast implant exchange, AlloDerm placement, and capsulectomy. C/o right breast drainage saturating bandage. Denies pain, f/c, or purulence.   Interval hx (7/15/19). Patient presents today POD#6 s/p bilateral breast implant exchange, AlloDerm placement, and capsulectomy. Doing well and very pleased with her result reporting improvement in pain, discomfort and overall aesthetics of her breasts. Compliant with oral antibiotics and surgical bra. Denies any fever, chills, drainage or bleeding.   interval hx (7/22/19): Here for POD#13 s/p bilateral breast implant exchange.  Doing well.  Very happy with results.  Denies fevers, chills, redness.  completed course of PO abx.  compliant with bandeau and surgical bra use.  Interval hx (8/2/19):  Here for POD#24 s/p bilateral breast implant exchange.  Went for right breast sono with no evidence of seroma.  Denies fevers, chills, redness.  c/o of right breast superior pole breast asymmetry and difference in volume.  Also c/o left breast soft tissue banding returning.  Interval hx (8/29/19): Pt presents for 1st postop visit- POD #7 s/p revision of right breast reconstruction, capsulectomy and implant exchange. Reports she feels this is an improvement. Denies pain or f/c.  Interval hx (9/5/19): 2nd  post-op visit POD#14 s/p revision of right breast reconstruction, partial capuslectomy and implant exchange. pt continue to feel improvement of right breast; no longer has right breast tightness.  Happy with result  Interval hx (10/11/19): 7 weeks s/p right breast reconstruction, partial capsulectomy and implant exchange.  Compliant with bandeau use.  Also taken Singulair as instructed.  Feels improvement of capsular contracture in both breasts (R>L).  She reports the right breast implant has improved position as well.  She is happy with her results.    Interval hx (6/15/20):  10 month s/p right breast reconstruction revision and implant exchange. Patient completed the singulair 4th trial.  She reports that she had left breast discomfort with associated chest wall tightness.  She then started course of stretching therapy with improvement of implant pressure.  Patient happy with breast reconstruction revision.  Able to wear swimsuit  Interval hx (10/16/20):  Here here 14 month s/p right breast reconstruction and implant exchange.  Interval hx (9/20/21):  here for 25 months s/p right breast recon, capsulectomy and implant exchange. right breast implant is softer than left breast.   performing breast exams, no new breast lumps.  Interval hx (10/8/21): Pt here with  for his office visit.  able to locate area of lump in left breast.  Interval hx (9/26/22): 3 years s/p BL breast reconstruction revision and implant exchange. No breast reconstruction complaints.  She felt the Singulair helped the appearance of her breasts.  Interval hx (9/25/23):  4 years s/p BL breast reconstruction revision and implant exchange.  No breast reconstruction complaints.  She felt the Singulair helped the appearance of her breasts, but only takes it occasionally because of HLD.  Interval hx (6/10/24):  4.75 years s/p BL breast reconstruction revision and implant exchange.  h/o left breast XRT. No breast reconstruction complaints.  Completed Singulair 1 month ago.  complaint of BL breast pain and tightness.  pt to undergo BL breast US for implant surveillance in 8/2024.  New c/o LIF pain with 2 day, easpecially in AM and gets better.  worsens with activity.  No triggering or locking.  No longer on arimidex.

## 2024-09-23 NOTE — ASSESSMENT
[FreeTextEntry1] : 74 y/o F with hx of BL Breast CA s/p Chemotherapy and left breast XRT, s/p TE and silicone implant reconstruction (submuscular) with bilateral symptomatic capsular contracture. s/p bilateral breast implant exchange, AlloDerm placement, and capsulectomy 7/9/19; was unhappy with breast asymmetry  4.75 years s/p revision of right breast reconstruction, capsulectomy and implant exchange, doing well Recurrent XRT-related capsular contracture, grade 3 stable Pt comfortable with excellent BUE ROM  Left 10:00 lateral border of pec with c/w scar tissue on US 10/2022 LIF OA, no trigger finger.  -reassurance given; pt counseled about OA mgmt (hand rest, NSAIDs) -c/w implant massage and BUE ROM exercises -no indication of implant abx ppx -Breast US reviewed - no implant rupture or seroma -All questions were answered -follow up in 2 years for repeat breast US -implant check at 3 years

## 2024-09-28 ENCOUNTER — NON-APPOINTMENT (OUTPATIENT)
Age: 79
End: 2024-09-28

## 2024-09-29 ENCOUNTER — EMERGENCY (EMERGENCY)
Facility: HOSPITAL | Age: 79
LOS: 0 days | Discharge: HOME | End: 2024-09-29
Attending: EMERGENCY MEDICINE
Payer: MEDICARE

## 2024-09-29 VITALS
HEART RATE: 82 BPM | WEIGHT: 158.07 LBS | RESPIRATION RATE: 18 BRPM | OXYGEN SATURATION: 97 % | HEIGHT: 62 IN | DIASTOLIC BLOOD PRESSURE: 97 MMHG | SYSTOLIC BLOOD PRESSURE: 178 MMHG

## 2024-09-29 VITALS
HEART RATE: 80 BPM | DIASTOLIC BLOOD PRESSURE: 88 MMHG | RESPIRATION RATE: 18 BRPM | OXYGEN SATURATION: 97 % | SYSTOLIC BLOOD PRESSURE: 152 MMHG

## 2024-09-29 DIAGNOSIS — I10 ESSENTIAL (PRIMARY) HYPERTENSION: ICD-10-CM

## 2024-09-29 DIAGNOSIS — S09.90XA UNSPECIFIED INJURY OF HEAD, INITIAL ENCOUNTER: ICD-10-CM
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
## 2024-09-29 DIAGNOSIS — Z98.82 BREAST IMPLANT STATUS: Chronic | ICD-10-CM

## 2024-09-29 DIAGNOSIS — M25.552 PAIN IN LEFT HIP: ICD-10-CM
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
## 2024-09-29 DIAGNOSIS — Z90.710 ACQUIRED ABSENCE OF BOTH CERVIX AND UTERUS: Chronic | ICD-10-CM

## 2024-09-29 DIAGNOSIS — Z98.49 CATARACT EXTRACTION STATUS, UNSPECIFIED EYE: Chronic | ICD-10-CM

## 2024-09-29 DIAGNOSIS — Z88.2 ALLERGY STATUS TO SULFONAMIDES: ICD-10-CM

## 2024-09-29 DIAGNOSIS — Z90.49 ACQUIRED ABSENCE OF OTHER SPECIFIED PARTS OF DIGESTIVE TRACT: Chronic | ICD-10-CM

## 2024-09-29 DIAGNOSIS — Z88.8 ALLERGY STATUS TO OTHER DRUGS, MEDICAMENTS AND BIOLOGICAL SUBSTANCES: ICD-10-CM

## 2024-09-29 DIAGNOSIS — Z90.13 ACQUIRED ABSENCE OF BILATERAL BREASTS AND NIPPLES: Chronic | ICD-10-CM

## 2024-09-29 DIAGNOSIS — E78.5 HYPERLIPIDEMIA, UNSPECIFIED: ICD-10-CM

## 2024-09-29 PROCEDURE — 70450 CT HEAD/BRAIN W/O DYE: CPT | Mod: MC

## 2024-09-29 PROCEDURE — 70450 CT HEAD/BRAIN W/O DYE: CPT | Mod: 26,MC

## 2024-09-29 PROCEDURE — 99284 EMERGENCY DEPT VISIT MOD MDM: CPT | Mod: FS

## 2024-09-29 PROCEDURE — 99284 EMERGENCY DEPT VISIT MOD MDM: CPT | Mod: 25

## 2024-09-29 RX ORDER — METHOCARBAMOL 750 MG/1
1 TABLET, FILM COATED ORAL
Qty: 9 | Refills: 0
Start: 2024-09-29 | End: 2024-10-01

## 2024-09-29 RX ORDER — LIDOCAINE/BENZALKONIUM/ALCOHOL
1 SOLUTION, NON-ORAL TOPICAL
Qty: 1 | Refills: 0
Start: 2024-09-29 | End: 2024-10-03

## 2024-09-29 NOTE — ED PROVIDER NOTE - CLINICAL SUMMARY MEDICAL DECISION MAKING FREE TEXT BOX
Fall few days ago with close head injury.  Patient had x-rays of the left hip done in urgent care sent in here for CT head.  Here CT head negative patient has no signs of injury ambulatory discharged home.

## 2024-09-29 NOTE — ED ADULT NURSE NOTE - PRIMARY CARE PROVIDER
"Chief Complaint   Patient presents with     Contraception       Initial /70  Pulse 72  Temp 98  F (36.7  C) (Tympanic)  Resp 14  Ht 5' 6\" (1.676 m)  Wt 137 lb (62.1 kg)  LMP 02/05/2017  BMI 22.11 kg/m2 Estimated body mass index is 22.11 kg/(m^2) as calculated from the following:    Height as of this encounter: 5' 6\" (1.676 m).    Weight as of this encounter: 137 lb (62.1 kg).  Medication Reconciliation: complete       Cindy Clinton CMA      "
The following medication was given:     MEDICATION: Depo Testosterone  200 mg  ROUTE: IM  SITE: [facility]  DOSE: 1 ml  LOT #: [id number]  :  EQUISO   EXPIRATION DATE:  4/2019  NDC#: [address]-8    Cindy Clinton CMA  
pmd

## 2024-09-29 NOTE — ED PROVIDER NOTE - PATIENT PORTAL LINK FT
You can access the FollowMyHealth Patient Portal offered by [facility] by registering at the following website: [email or web address]. By joining [email or web address]’s FollowMyHealth portal, you will also be able to view your health information using other applications (apps) compatible with our system.

## 2024-09-29 NOTE — ED PROVIDER NOTE - WET READ LAUNCH FT
Presented to Labor and Delivery for cornejo bulb placement for cervical ripening. Triage assessment dONE AND wnl. Dr. Glenna Newman notified of patient admission but busy on OB unit. Pt. Is  at 44 3/7 gestation. EFM on and will continue to observe. There are no Wet Read(s) to document.

## 2024-09-29 NOTE — ED PROVIDER NOTE - NSTIMEPROVIDERCAREINITIATE_GEN_ER
There are no preventive care reminders to display for this patient.    Patient is up to date, no discussion needed.           29-Sep-2024 11:58

## 2024-09-29 NOTE — ED PROVIDER NOTE - PHYSICAL EXAMINATION
Satisfactory VITAL SIGNS: I have reviewed nursing notes and confirm.  CONSTITUTIONAL: Patient is in no acute distress.  SKIN: Skin exam is warm and dry, no acute rash.  HEAD: Normocephalic; atraumatic.  EYES: PERRL, EOM intact; conjunctiva and sclera clear.  ENT: No nasal discharge; airway clear.   NECK: Supple; non tender.  CARD: S1, S2 normal; no murmurs, gallops, or rubs. Regular rate and rhythm.  RESP: Clear to auscultation bilaterally. No wheezes, rales or rhonchi.  ABD: Normal bowel sounds; soft; non-distended; non-tender.   EXT: Normal ROM. No edema. Mild tenderness to left hip.   LYMPH: No acute cervical adenopathy.  NEURO: Alert, oriented. Grossly unremarkable. No focal deficits.  PSYCH: Cooperative, appropriate.

## 2024-09-29 NOTE — ED PROVIDER NOTE - OBJECTIVE STATEMENT
79-year-old female with past medical history of hypertension, hyperlipidemia sent to ED from urgent care for evaluation of head injury on Thursday.  Patient states that she fell out of the car and did hit her head.  No blood thinners.  No LOC.  Patient is not having any current headache.  Patient reports some left hip pain.  Patient states that she had x-rays done at urgent care.  No paresthesias or weakness.

## 2024-10-03 ENCOUNTER — OUTPATIENT (OUTPATIENT)
Dept: OUTPATIENT SERVICES | Facility: HOSPITAL | Age: 79
LOS: 1 days | End: 2024-10-03
Payer: MEDICARE

## 2024-10-03 ENCOUNTER — APPOINTMENT (OUTPATIENT)
Age: 79
End: 2024-10-03

## 2024-10-03 VITALS
HEIGHT: 62 IN | HEART RATE: 83 BPM | WEIGHT: 160 LBS | RESPIRATION RATE: 16 BRPM | BODY MASS INDEX: 29.44 KG/M2 | TEMPERATURE: 98.3 F | OXYGEN SATURATION: 98 % | SYSTOLIC BLOOD PRESSURE: 179 MMHG | DIASTOLIC BLOOD PRESSURE: 98 MMHG

## 2024-10-03 DIAGNOSIS — Z90.13 ACQUIRED ABSENCE OF BILATERAL BREASTS AND NIPPLES: Chronic | ICD-10-CM

## 2024-10-03 DIAGNOSIS — Z98.82 BREAST IMPLANT STATUS: Chronic | ICD-10-CM

## 2024-10-03 DIAGNOSIS — C50.919 MALIGNANT NEOPLASM OF UNSPECIFIED SITE OF UNSPECIFIED FEMALE BREAST: ICD-10-CM

## 2024-10-03 DIAGNOSIS — Z90.49 ACQUIRED ABSENCE OF OTHER SPECIFIED PARTS OF DIGESTIVE TRACT: Chronic | ICD-10-CM

## 2024-10-03 DIAGNOSIS — Z98.49 CATARACT EXTRACTION STATUS, UNSPECIFIED EYE: Chronic | ICD-10-CM

## 2024-10-03 DIAGNOSIS — Z90.710 ACQUIRED ABSENCE OF BOTH CERVIX AND UTERUS: Chronic | ICD-10-CM

## 2024-10-03 PROCEDURE — 99213 OFFICE O/P EST LOW 20 MIN: CPT

## 2024-10-03 NOTE — BEGINNING OF VISIT
[0] : 2) Feeling down, depressed, or hopeless: Not at all (0) [PHQ-2 Negative] : PHQ-2 Negative [PHQ-9 Negative] : PHQ-9 Negative [[id number]] : 0 [Pain Scale: ___] : On a scale of 1-10, today the patient's pain is a(n) [unfilled]. [Never] : Never [Date Discussed (MM/DD/YY): ___] : Discussed: [unfilled] [Abdominal Pain] : no abdominal pain [Vomiting] : no vomiting [Constipation] : no constipation

## 2024-10-03 NOTE — BEGINNING OF VISIT
[0] : 2) Feeling down, depressed, or hopeless: Not at all (0) [PHQ-2 Negative] : PHQ-2 Negative [PHQ-9 Negative] : PHQ-9 Negative [[id number]] : 0 [Pain Scale: ___] : On a scale of 1-10, today the patient's pain is a(n) [unfilled]. [Never] : Never [Date Discussed (MM/DD/YY): ___] : Discussed: [unfilled] [Abdominal Pain] : no abdominal pain [Vomiting] : no vomiting [Constipation] : no constipation

## 2024-10-03 NOTE — BEGINNING OF VISIT
[0] : 2) Feeling down, depressed, or hopeless: Not at all (0) [PHQ-2 Negative] : PHQ-2 Negative [PHQ-9 Negative] : PHQ-9 Negative [[id number]] : 0 [Pain Scale: ___] : On a scale of 1-10, today the patient's pain is a(n) [unfilled]. [Never] : Never [Date Discussed (MM/DD/YY): ___] : Discussed: [unfilled] [Abdominal Pain] : no abdominal pain [Vomiting] : no vomiting [Constipation] : no constipation

## 2024-10-04 DIAGNOSIS — C50.919 MALIGNANT NEOPLASM OF UNSPECIFIED SITE OF UNSPECIFIED FEMALE BREAST: ICD-10-CM

## 2024-10-04 NOTE — ASSESSMENT
[FreeTextEntry1] : Patient is a 78 year old woman with H/O b/l breast cancer.  Left side: [id number] Rt side [id number] HR +, Her2 Neg  Pt received Adjuvant chemo with AC and Taxol and has been on Arimidex in 12/15.  BCI showed no benefit from extended endocrine therapy, has a 17% risk of late distant recurrence (years 5-10) for HR+, LN +  Blood work from 9/19/23 reviewed, Calcium level 11.3. Pt told to f/u with endocrinologist.  RECOMMENDATIONS: Previous notes reviewed and all relevant results discussed with Dr Brantley and were communicated to the patient.  --  Arimidex 1 mg PO daily completed 5/2024..  .  -- Previous CBC, CMP, CA 15-3 and CEA   reviewed and stable -- Follow with endocrine for thyroid findings and hypercalcemia. -- Continue to follow up with PCP, cardio, gyn and GI as recommended.  #Osteopenia/Osteoporosis -- Bone density done 5/22/24 showed Osteoporosis -- Encouraged weight bearing exercises as tolerated. -- Continue Vitamin D. --Follow up with endocrinologist.  She was on bisphosphonates x 5 years in the past.  #Leucocytosis -- Stable no e/o myeloproliferative process. -- Continue to monitor --F/u with PCP  F/U in one year.

## 2024-10-04 NOTE — ASSESSMENT
[FreeTextEntry1] : Patient is a 78 year old woman with H/O b/l breast cancer.  Left side: [id number] Rt side [id number] HR +, Her2 Neg  Pt received Adjuvant chemo with AC and Taxol and has been on Arimidex in 12/15.  BCI showed no benefit from extended endocrine therapy, has a 17% risk of late distant recurrence (years 5-10) for HR+, LN +  Blood work from 9/19/23 reviewed, Calcium level 11.3. Pt told to f/u with endocrinologist.  RECOMMENDATIONS: Previous notes reviewed and all relevant results discussed with Dr Brantley and were communicated to the patient.  --  Arimidex 1 mg PO daily completed 5/2024..  .  -- Previous CBC, CMP, CA 15-3 and CEA   reviewed and stable -- Follow with endocrine for thyroid findings and hypercalcemia. -- Continue to follow up with PCP, cardio, gyn and GI as recommended.  #Osteopenia/Osteoporosis -- Bone density done 5/22/24 showed Osteoporosis -- Encouraged weight bearing exercises as tolerated. -- Continue Vitamin D. --Follow up with endocrinologist.  She was on bisphosphonates x 5 years in the past.  #Leucocytosis -- Stable no e/o myeloproliferative process. -- Continue to monitor --F/u with PCP  F/U in one year.

## 2024-10-04 NOTE — PHYSICAL EXAM
[Fully active, able to carry on all pre-disease performance without restriction] : Status 0 - Fully active, able to carry on all pre-disease performance without restriction [Normal] : affect appropriate [de-identified] : Overweight. [de-identified] : s/p bilateral mastectomy with reconstruction; chest/breast exam today is unrevealing.

## 2024-10-04 NOTE — HISTORY OF PRESENT ILLNESS
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 [de-identified] : 12/29/22 Patient is here to follow up for breast cancer. She stopped taking Arimidex on 12/2/22 completing 7 years, since she is having a lot of joint stiffness but concerned of recurrence should she stop AI. She denies any new breast/chest concerns. She states that her cardio switched her statin to Repatha which she will start after discussing with us if it's ok to proceed with it. She does not have copy of her most recent bone density, she gets Prolia injections in [place] where she resides also.  5/16/23 Patient is here to follow up for breast cancer. She continued taking Arimidex, tolerating well. She takes Vitamin D but not Calcium supplement because her Ca levels are elevated. She denies any new breast/chest concerns. She c/o of chronic back pain, MRI was done in [place] as per patient and her MD told her it was due to arthritic changes. She completed PT 2x/week x 2 months. She is going for MRI of the back, pending appt.  9/19/23 Patient is here to follow up for breast cancer. She continued taking Arimidex, tolerating well. She takes Vitamin D but not Calcium supplement because her Ca levels are elevated. She denies any new breast/chest concerns. She c/o of chronic back pain, following up with Dr Chowdhury.  5/28/24 Patient is here to follow up for breast cancer. She continued taking Arimidex since 2/2015 tolerating well. Of note, she was off Arimidex x 3 weeks in 2022. She takes Vitamin D but not Calcium supplement because her Ca levels are elevated. She denies any new breast/chest concerns. She c/o of chronic back pain, following up with Dr Chris in [place]. Back pain relieved with Epidural injections. Last bone density done 5/22/24 which showed Osteoporosis.  She was on Prolia (6x) last injection 2022.  She was on biphosphonates for ~5yrs. She feels she developed spinal stenosis from taking Prolia.  Back pain relieved with epidural injections in early 2024.  10/3/24 Pt is here for follow up visit. She offers no new clinical complaints. She is feeling well.

## 2024-10-04 NOTE — HISTORY OF PRESENT ILLNESS
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 [de-identified] : 12/29/22 Patient is here to follow up for breast cancer. She stopped taking Arimidex on 12/2/22 completing 7 years, since she is having a lot of joint stiffness but concerned of recurrence should she stop AI. She denies any new breast/chest concerns. She states that her cardio switched her statin to Repatha which she will start after discussing with us if it's ok to proceed with it. She does not have copy of her most recent bone density, she gets Prolia injections in [place] where she resides also.  5/16/23 Patient is here to follow up for breast cancer. She continued taking Arimidex, tolerating well. She takes Vitamin D but not Calcium supplement because her Ca levels are elevated. She denies any new breast/chest concerns. She c/o of chronic back pain, MRI was done in [place] as per patient and her MD told her it was due to arthritic changes. She completed PT 2x/week x 2 months. She is going for MRI of the back, pending appt.  9/19/23 Patient is here to follow up for breast cancer. She continued taking Arimidex, tolerating well. She takes Vitamin D but not Calcium supplement because her Ca levels are elevated. She denies any new breast/chest concerns. She c/o of chronic back pain, following up with Dr Chowdhury.  5/28/24 Patient is here to follow up for breast cancer. She continued taking Arimidex since 2/2015 tolerating well. Of note, she was off Arimidex x 3 weeks in 2022. She takes Vitamin D but not Calcium supplement because her Ca levels are elevated. She denies any new breast/chest concerns. She c/o of chronic back pain, following up with Dr Chris in [place]. Back pain relieved with Epidural injections. Last bone density done 5/22/24 which showed Osteoporosis.  She was on Prolia (6x) last injection 2022.  She was on biphosphonates for ~5yrs. She feels she developed spinal stenosis from taking Prolia.  Back pain relieved with epidural injections in early 2024.  10/3/24 Pt is here for follow up visit. She offers no new clinical complaints. She is feeling well.

## 2024-10-04 NOTE — PHYSICAL EXAM
[Fully active, able to carry on all pre-disease performance without restriction] : Status 0 - Fully active, able to carry on all pre-disease performance without restriction [Normal] : affect appropriate [de-identified] : Overweight. [de-identified] : s/p bilateral mastectomy with reconstruction; chest/breast exam today is unrevealing.

## 2024-10-04 NOTE — PHYSICAL EXAM
[Fully active, able to carry on all pre-disease performance without restriction] : Status 0 - Fully active, able to carry on all pre-disease performance without restriction [Normal] : affect appropriate [de-identified] : Overweight. [de-identified] : s/p bilateral mastectomy with reconstruction; chest/breast exam today is unrevealing.

## 2024-10-04 NOTE — ASSESSMENT
[FreeTextEntry1] : Patient is a 78 year old woman with H/O b/l breast cancer.  Left side: [id number] Rt side [id number] HR +, Her2 Neg  Pt received Adjuvant chemo with AC and Taxol and has been on Arimidex in 12/15.  BCI showed no benefit from extended endocrine therapy, has a 17% risk of late distant recurrence (years 5-10) for HR+, LN +  Blood work from 9/19/23 reviewed, Calcium level 11.3. Pt told to f/u with endocrinologist.  RECOMMENDATIONS: Previous notes reviewed and all relevant results discussed with Dr Brantley and were communicated to the patient.  --  Arimidex 1 mg PO daily completed 5/2024..  .  -- Previous CBC, CMP, CA 15-3 and CEA   reviewed and stable -- Follow with endocrine for thyroid findings and hypercalcemia. -- Continue to follow up with PCP, cardio, gyn and GI as recommended.  #Osteopenia/Osteoporosis -- Bone density done 5/22/24 showed Osteoporosis -- Encouraged weight bearing exercises as tolerated. -- Continue Vitamin D. --Follow up with endocrinologist.  She was on bisphosphonates x 5 years in the past.  #Leucocytosis -- Stable no e/o myeloproliferative process. -- Continue to monitor --F/u with PCP  F/U in one year.

## 2024-10-04 NOTE — HISTORY OF PRESENT ILLNESS
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 [de-identified] : 12/29/22 Patient is here to follow up for breast cancer. She stopped taking Arimidex on 12/2/22 completing 7 years, since she is having a lot of joint stiffness but concerned of recurrence should she stop AI. She denies any new breast/chest concerns. She states that her cardio switched her statin to Repatha which she will start after discussing with us if it's ok to proceed with it. She does not have copy of her most recent bone density, she gets Prolia injections in [place] where she resides also.  5/16/23 Patient is here to follow up for breast cancer. She continued taking Arimidex, tolerating well. She takes Vitamin D but not Calcium supplement because her Ca levels are elevated. She denies any new breast/chest concerns. She c/o of chronic back pain, MRI was done in [place] as per patient and her MD told her it was due to arthritic changes. She completed PT 2x/week x 2 months. She is going for MRI of the back, pending appt.  9/19/23 Patient is here to follow up for breast cancer. She continued taking Arimidex, tolerating well. She takes Vitamin D but not Calcium supplement because her Ca levels are elevated. She denies any new breast/chest concerns. She c/o of chronic back pain, following up with Dr Chowdhury.  5/28/24 Patient is here to follow up for breast cancer. She continued taking Arimidex since 2/2015 tolerating well. Of note, she was off Arimidex x 3 weeks in 2022. She takes Vitamin D but not Calcium supplement because her Ca levels are elevated. She denies any new breast/chest concerns. She c/o of chronic back pain, following up with Dr Chris in [place]. Back pain relieved with Epidural injections. Last bone density done 5/22/24 which showed Osteoporosis.  She was on Prolia (6x) last injection 2022.  She was on biphosphonates for ~5yrs. She feels she developed spinal stenosis from taking Prolia.  Back pain relieved with epidural injections in early 2024.  10/3/24 Pt is here for follow up visit. She offers no new clinical complaints. She is feeling well.

## 2024-10-08 ENCOUNTER — RX RENEWAL (OUTPATIENT)
Age: 79
End: 2024-10-08

## 2024-11-11 ENCOUNTER — RX RENEWAL (OUTPATIENT)
Age: 79
End: 2024-11-11

## 2025-01-10 ENCOUNTER — RX RENEWAL (OUTPATIENT)
Age: 80
End: 2025-01-10

## 2025-04-10 ENCOUNTER — RX RENEWAL (OUTPATIENT)
Age: 80
End: 2025-04-10

## 2025-05-10 NOTE — ASSESSMENT
"Regardin FreeTextEntry1] : 72 y/o F with hx of BL Breast CA s/p Chemotherapy and left breast XRT, s/p TE and silicone implant reconstruction (submuscular) with bilateral symptomatic capsular contracture.\par s/p bilateral breast implant exchange, AlloDerm placement, and capsulectomy 7/9/19; was unhappy with breast asymmetry\par \par 3 years s/p revision of right breast reconstruction, capsulectomy and implant exchange, doing well\par Recurrent XRT-related capsular contracture, grade 3 stable\par Pt comfortable with excellent BUE ROM\par \par Left 10:00 lateral border of pec with c/w scar tissue on US.\par \par -c/w implant massage and BUE ROM exercises\par -Reviewed implant abx ppx\par -Renewed doxycycline for implant abx ppx & singulair for CC\par -All questions were answered\par -Follow up in 1 year\par \par Due to COVID-19, pre-visit patient instructions were explained to the patient and their symptoms were checked upon arrival. Masks were used by the healthcare provider and staff and the examination room was cleaned after the patient visit concluded\par

## 2025-05-21 ENCOUNTER — NON-APPOINTMENT (OUTPATIENT)
Age: 80
End: 2025-05-21

## 2025-05-23 ENCOUNTER — APPOINTMENT (OUTPATIENT)
Dept: CARDIOLOGY | Facility: CLINIC | Age: 80
End: 2025-05-23
Payer: MEDICARE

## 2025-05-23 VITALS
OXYGEN SATURATION: 97 % | SYSTOLIC BLOOD PRESSURE: 120 MMHG | DIASTOLIC BLOOD PRESSURE: 80 MMHG | HEART RATE: 81 BPM | BODY MASS INDEX: 28.89 KG/M2 | WEIGHT: 157 LBS | HEIGHT: 62 IN

## 2025-05-23 DIAGNOSIS — I10 ESSENTIAL (PRIMARY) HYPERTENSION: ICD-10-CM

## 2025-05-23 DIAGNOSIS — E78.5 HYPERLIPIDEMIA, UNSPECIFIED: ICD-10-CM

## 2025-05-23 DIAGNOSIS — C50.911 MALIGNANT NEOPLASM OF UNSPECIFIED SITE OF RIGHT FEMALE BREAST: ICD-10-CM

## 2025-05-23 DIAGNOSIS — R00.2 PALPITATIONS: ICD-10-CM

## 2025-05-23 DIAGNOSIS — C50.912 MALIGNANT NEOPLASM OF UNSPECIFIED SITE OF RIGHT FEMALE BREAST: ICD-10-CM

## 2025-05-23 DIAGNOSIS — R06.02 SHORTNESS OF BREATH: ICD-10-CM

## 2025-05-23 PROCEDURE — 99214 OFFICE O/P EST MOD 30 MIN: CPT

## 2025-05-23 PROCEDURE — G2211 COMPLEX E/M VISIT ADD ON: CPT

## 2025-06-03 ENCOUNTER — OUTPATIENT (OUTPATIENT)
Dept: OUTPATIENT SERVICES | Facility: HOSPITAL | Age: 80
LOS: 1 days | End: 2025-06-03
Payer: MEDICARE

## 2025-06-03 DIAGNOSIS — Z90.49 ACQUIRED ABSENCE OF OTHER SPECIFIED PARTS OF DIGESTIVE TRACT: Chronic | ICD-10-CM

## 2025-06-03 DIAGNOSIS — Z13.820 ENCOUNTER FOR SCREENING FOR OSTEOPOROSIS: ICD-10-CM

## 2025-06-03 DIAGNOSIS — Z90.13 ACQUIRED ABSENCE OF BILATERAL BREASTS AND NIPPLES: Chronic | ICD-10-CM

## 2025-06-03 DIAGNOSIS — Z00.8 ENCOUNTER FOR OTHER GENERAL EXAMINATION: ICD-10-CM

## 2025-06-03 DIAGNOSIS — Z98.82 BREAST IMPLANT STATUS: Chronic | ICD-10-CM

## 2025-06-03 DIAGNOSIS — Z90.710 ACQUIRED ABSENCE OF BOTH CERVIX AND UTERUS: Chronic | ICD-10-CM

## 2025-06-03 DIAGNOSIS — Z98.49 CATARACT EXTRACTION STATUS, UNSPECIFIED EYE: Chronic | ICD-10-CM

## 2025-06-03 PROCEDURE — 77080 DXA BONE DENSITY AXIAL: CPT | Mod: 26

## 2025-06-03 PROCEDURE — 77080 DXA BONE DENSITY AXIAL: CPT

## 2025-06-04 DIAGNOSIS — Z13.820 ENCOUNTER FOR SCREENING FOR OSTEOPOROSIS: ICD-10-CM

## 2025-06-09 DIAGNOSIS — M81.0 AGE-RELATED OSTEOPOROSIS WITHOUT CURRENT PATHOLOGICAL FRACTURE: ICD-10-CM

## 2025-06-30 ENCOUNTER — APPOINTMENT (OUTPATIENT)
Dept: SURGERY | Facility: CLINIC | Age: 80
End: 2025-06-30
Payer: MEDICARE

## 2025-06-30 VITALS
HEIGHT: 62 IN | BODY MASS INDEX: 29.08 KG/M2 | TEMPERATURE: 97 F | SYSTOLIC BLOOD PRESSURE: 142 MMHG | WEIGHT: 158 LBS | DIASTOLIC BLOOD PRESSURE: 86 MMHG

## 2025-06-30 PROCEDURE — 99212 OFFICE O/P EST SF 10 MIN: CPT

## 2025-07-02 RX ORDER — DULOXETINE HYDROCHLORIDE 20 MG/1
20 CAPSULE, DELAYED RELEASE PELLETS ORAL
Qty: 30 | Refills: 0 | Status: ACTIVE | COMMUNITY
Start: 2025-05-22

## 2025-07-02 RX ORDER — METHOTREXATE 2.5 MG/1
2.5 TABLET ORAL
Qty: 24 | Refills: 0 | Status: ACTIVE | COMMUNITY
Start: 2025-06-24

## 2025-07-02 RX ORDER — FOLIC ACID 1 MG/1
1 TABLET ORAL
Qty: 30 | Refills: 0 | Status: ACTIVE | COMMUNITY
Start: 2025-06-24

## 2025-07-07 ENCOUNTER — APPOINTMENT (OUTPATIENT)
Dept: PLASTIC SURGERY | Facility: CLINIC | Age: 80
End: 2025-07-07
Payer: MEDICARE

## 2025-07-07 PROCEDURE — 99214 OFFICE O/P EST MOD 30 MIN: CPT

## 2025-09-08 ENCOUNTER — APPOINTMENT (OUTPATIENT)
Dept: CARDIOLOGY | Facility: CLINIC | Age: 80
End: 2025-09-08
Payer: MEDICARE

## 2025-09-08 ENCOUNTER — NON-APPOINTMENT (OUTPATIENT)
Age: 80
End: 2025-09-08

## 2025-09-08 VITALS
HEART RATE: 84 BPM | OXYGEN SATURATION: 98 % | WEIGHT: 156 LBS | HEIGHT: 62 IN | DIASTOLIC BLOOD PRESSURE: 96 MMHG | SYSTOLIC BLOOD PRESSURE: 142 MMHG | BODY MASS INDEX: 28.71 KG/M2

## 2025-09-08 DIAGNOSIS — E66.3 OVERWEIGHT: ICD-10-CM

## 2025-09-08 DIAGNOSIS — E78.5 HYPERLIPIDEMIA, UNSPECIFIED: ICD-10-CM

## 2025-09-08 DIAGNOSIS — I10 ESSENTIAL (PRIMARY) HYPERTENSION: ICD-10-CM

## 2025-09-08 PROCEDURE — 99204 OFFICE O/P NEW MOD 45 MIN: CPT

## 2025-09-08 RX ORDER — AMLODIPINE BESYLATE 2.5 MG/1
2.5 TABLET ORAL DAILY
Qty: 90 | Refills: 3 | Status: ACTIVE | COMMUNITY
Start: 2025-09-08 | End: 1900-01-01